# Patient Record
Sex: FEMALE | Race: WHITE | NOT HISPANIC OR LATINO | Employment: PART TIME | URBAN - METROPOLITAN AREA
[De-identification: names, ages, dates, MRNs, and addresses within clinical notes are randomized per-mention and may not be internally consistent; named-entity substitution may affect disease eponyms.]

---

## 2016-02-18 LAB — CRC RECOMMENDATION EXT: NORMAL

## 2020-02-19 LAB — BCS RECOMMENDATION EXT: NORMAL

## 2020-11-11 LAB
PAP RECOMMENDATION EXT: NORMAL
PAP SMEAR: NORMAL

## 2021-03-06 ENCOUNTER — IMMUNIZATION (OUTPATIENT)
Dept: PRIMARY CARE CLINIC | Facility: CLINIC | Age: 56
End: 2021-03-06
Payer: COMMERCIAL

## 2021-03-06 DIAGNOSIS — Z23 NEED FOR VACCINATION: Primary | ICD-10-CM

## 2021-03-06 PROCEDURE — 0001A PR IMMUNIZ ADMIN, SARS-COV-2 COVID-19 VACC, 30MCG/0.3ML, 1ST DOSE: ICD-10-PCS | Mod: CV19,S$GLB,, | Performed by: INTERNAL MEDICINE

## 2021-03-06 PROCEDURE — 0001A PR IMMUNIZ ADMIN, SARS-COV-2 COVID-19 VACC, 30MCG/0.3ML, 1ST DOSE: CPT | Mod: CV19,S$GLB,, | Performed by: INTERNAL MEDICINE

## 2021-03-06 PROCEDURE — 91300 PR SARS-COV- 2 COVID-19 VACCINE, NO PRSV, 30MCG/0.3ML, IM: CPT | Mod: S$GLB,,, | Performed by: INTERNAL MEDICINE

## 2021-03-06 PROCEDURE — 91300 PR SARS-COV- 2 COVID-19 VACCINE, NO PRSV, 30MCG/0.3ML, IM: ICD-10-PCS | Mod: S$GLB,,, | Performed by: INTERNAL MEDICINE

## 2021-03-06 RX ADMIN — Medication 0.3 ML: at 03:03

## 2021-03-27 ENCOUNTER — IMMUNIZATION (OUTPATIENT)
Dept: PRIMARY CARE CLINIC | Facility: CLINIC | Age: 56
End: 2021-03-27
Payer: COMMERCIAL

## 2021-03-27 DIAGNOSIS — Z23 NEED FOR VACCINATION: Primary | ICD-10-CM

## 2021-03-27 PROCEDURE — 0002A PR IMMUNIZ ADMIN, SARS-COV-2 COVID-19 VACC, 30MCG/0.3ML, 2ND DOSE: ICD-10-PCS | Mod: CV19,S$GLB,, | Performed by: INTERNAL MEDICINE

## 2021-03-27 PROCEDURE — 0002A PR IMMUNIZ ADMIN, SARS-COV-2 COVID-19 VACC, 30MCG/0.3ML, 2ND DOSE: CPT | Mod: CV19,S$GLB,, | Performed by: INTERNAL MEDICINE

## 2021-03-27 PROCEDURE — 91300 PR SARS-COV- 2 COVID-19 VACCINE, NO PRSV, 30MCG/0.3ML, IM: CPT | Mod: S$GLB,,, | Performed by: INTERNAL MEDICINE

## 2021-03-27 PROCEDURE — 91300 PR SARS-COV- 2 COVID-19 VACCINE, NO PRSV, 30MCG/0.3ML, IM: ICD-10-PCS | Mod: S$GLB,,, | Performed by: INTERNAL MEDICINE

## 2021-03-27 RX ADMIN — Medication 0.3 ML: at 03:03

## 2022-03-17 LAB
LEFT EYE DM RETINOPATHY: NEGATIVE
RIGHT EYE DM RETINOPATHY: NEGATIVE

## 2022-05-06 LAB — HBA1C MFR BLD: 8.8 % (ref 4–5.6)

## 2022-09-20 ENCOUNTER — OFFICE VISIT (OUTPATIENT)
Dept: FAMILY MEDICINE | Facility: CLINIC | Age: 57
End: 2022-09-20
Payer: COMMERCIAL

## 2022-09-20 ENCOUNTER — TELEPHONE (OUTPATIENT)
Dept: FAMILY MEDICINE | Facility: CLINIC | Age: 57
End: 2022-09-20

## 2022-09-20 ENCOUNTER — PATIENT OUTREACH (OUTPATIENT)
Dept: ADMINISTRATIVE | Facility: HOSPITAL | Age: 57
End: 2022-09-20
Payer: COMMERCIAL

## 2022-09-20 VITALS
WEIGHT: 190.81 LBS | RESPIRATION RATE: 18 BRPM | HEART RATE: 87 BPM | OXYGEN SATURATION: 98 % | HEIGHT: 63 IN | TEMPERATURE: 98 F | SYSTOLIC BLOOD PRESSURE: 136 MMHG | BODY MASS INDEX: 33.81 KG/M2 | DIASTOLIC BLOOD PRESSURE: 82 MMHG

## 2022-09-20 DIAGNOSIS — Z11.4 ENCOUNTER FOR SCREENING FOR HIV: ICD-10-CM

## 2022-09-20 DIAGNOSIS — E11.65 TYPE 2 DIABETES MELLITUS WITH HYPERGLYCEMIA, WITHOUT LONG-TERM CURRENT USE OF INSULIN: ICD-10-CM

## 2022-09-20 DIAGNOSIS — K21.9 GASTROESOPHAGEAL REFLUX DISEASE, UNSPECIFIED WHETHER ESOPHAGITIS PRESENT: ICD-10-CM

## 2022-09-20 DIAGNOSIS — Z23 NEED FOR DIPHTHERIA-TETANUS-PERTUSSIS (TDAP) VACCINE: ICD-10-CM

## 2022-09-20 DIAGNOSIS — Z12.31 ENCOUNTER FOR SCREENING MAMMOGRAM FOR BREAST CANCER: ICD-10-CM

## 2022-09-20 DIAGNOSIS — E78.1 HYPERTRIGLYCERIDEMIA: ICD-10-CM

## 2022-09-20 DIAGNOSIS — E03.9 HYPOTHYROIDISM, UNSPECIFIED TYPE: ICD-10-CM

## 2022-09-20 DIAGNOSIS — E11.65 TYPE 2 DIABETES MELLITUS WITH HYPERGLYCEMIA, WITHOUT LONG-TERM CURRENT USE OF INSULIN: Primary | ICD-10-CM

## 2022-09-20 DIAGNOSIS — Z23 INFLUENZA VACCINE NEEDED: ICD-10-CM

## 2022-09-20 DIAGNOSIS — Z00.00 ENCOUNTER FOR MEDICAL EXAMINATION TO ESTABLISH CARE: Primary | ICD-10-CM

## 2022-09-20 DIAGNOSIS — M25.50 POLYARTHRALGIA: ICD-10-CM

## 2022-09-20 DIAGNOSIS — E66.09 CLASS 1 OBESITY DUE TO EXCESS CALORIES WITH SERIOUS COMORBIDITY AND BODY MASS INDEX (BMI) OF 33.0 TO 33.9 IN ADULT: ICD-10-CM

## 2022-09-20 DIAGNOSIS — I10 ESSENTIAL HYPERTENSION: ICD-10-CM

## 2022-09-20 PROCEDURE — 3075F PR MOST RECENT SYSTOLIC BLOOD PRESS GE 130-139MM HG: ICD-10-PCS | Mod: CPTII,S$GLB,, | Performed by: FAMILY MEDICINE

## 2022-09-20 PROCEDURE — 3075F SYST BP GE 130 - 139MM HG: CPT | Mod: CPTII,S$GLB,, | Performed by: FAMILY MEDICINE

## 2022-09-20 PROCEDURE — 90715 TDAP VACCINE 7 YRS/> IM: CPT | Mod: S$GLB,,, | Performed by: FAMILY MEDICINE

## 2022-09-20 PROCEDURE — 90471 IMMUNIZATION ADMIN: CPT | Mod: S$GLB,,, | Performed by: FAMILY MEDICINE

## 2022-09-20 PROCEDURE — 3079F DIAST BP 80-89 MM HG: CPT | Mod: CPTII,S$GLB,, | Performed by: FAMILY MEDICINE

## 2022-09-20 PROCEDURE — 3079F PR MOST RECENT DIASTOLIC BLOOD PRESSURE 80-89 MM HG: ICD-10-PCS | Mod: CPTII,S$GLB,, | Performed by: FAMILY MEDICINE

## 2022-09-20 PROCEDURE — 99214 PR OFFICE/OUTPT VISIT, EST, LEVL IV, 30-39 MIN: ICD-10-PCS | Mod: 25,S$GLB,, | Performed by: FAMILY MEDICINE

## 2022-09-20 PROCEDURE — 90686 IIV4 VACC NO PRSV 0.5 ML IM: CPT | Mod: S$GLB,,, | Performed by: FAMILY MEDICINE

## 2022-09-20 PROCEDURE — 99999 PR PBB SHADOW E&M-EST. PATIENT-LVL V: CPT | Mod: PBBFAC,,, | Performed by: FAMILY MEDICINE

## 2022-09-20 PROCEDURE — 1160F PR REVIEW ALL MEDS BY PRESCRIBER/CLIN PHARMACIST DOCUMENTED: ICD-10-PCS | Mod: CPTII,S$GLB,, | Performed by: FAMILY MEDICINE

## 2022-09-20 PROCEDURE — 99214 OFFICE O/P EST MOD 30 MIN: CPT | Mod: 25,S$GLB,, | Performed by: FAMILY MEDICINE

## 2022-09-20 PROCEDURE — 1159F MED LIST DOCD IN RCRD: CPT | Mod: CPTII,S$GLB,, | Performed by: FAMILY MEDICINE

## 2022-09-20 PROCEDURE — 1160F RVW MEDS BY RX/DR IN RCRD: CPT | Mod: CPTII,S$GLB,, | Performed by: FAMILY MEDICINE

## 2022-09-20 PROCEDURE — 1159F PR MEDICATION LIST DOCUMENTED IN MEDICAL RECORD: ICD-10-PCS | Mod: CPTII,S$GLB,, | Performed by: FAMILY MEDICINE

## 2022-09-20 PROCEDURE — 4010F PR ACE/ARB THEARPY RXD/TAKEN: ICD-10-PCS | Mod: CPTII,S$GLB,, | Performed by: FAMILY MEDICINE

## 2022-09-20 PROCEDURE — 90471 FLU VACCINE (QUAD) GREATER THAN OR EQUAL TO 3YO PRESERVATIVE FREE IM: ICD-10-PCS | Mod: S$GLB,,, | Performed by: FAMILY MEDICINE

## 2022-09-20 PROCEDURE — 90715 TDAP VACCINE GREATER THAN OR EQUAL TO 7YO IM: ICD-10-PCS | Mod: S$GLB,,, | Performed by: FAMILY MEDICINE

## 2022-09-20 PROCEDURE — 3008F BODY MASS INDEX DOCD: CPT | Mod: CPTII,S$GLB,, | Performed by: FAMILY MEDICINE

## 2022-09-20 PROCEDURE — 4010F ACE/ARB THERAPY RXD/TAKEN: CPT | Mod: CPTII,S$GLB,, | Performed by: FAMILY MEDICINE

## 2022-09-20 PROCEDURE — 90472 TDAP VACCINE GREATER THAN OR EQUAL TO 7YO IM: ICD-10-PCS | Mod: S$GLB,,, | Performed by: FAMILY MEDICINE

## 2022-09-20 PROCEDURE — 90472 IMMUNIZATION ADMIN EACH ADD: CPT | Mod: S$GLB,,, | Performed by: FAMILY MEDICINE

## 2022-09-20 PROCEDURE — 90686 FLU VACCINE (QUAD) GREATER THAN OR EQUAL TO 3YO PRESERVATIVE FREE IM: ICD-10-PCS | Mod: S$GLB,,, | Performed by: FAMILY MEDICINE

## 2022-09-20 PROCEDURE — 3008F PR BODY MASS INDEX (BMI) DOCUMENTED: ICD-10-PCS | Mod: CPTII,S$GLB,, | Performed by: FAMILY MEDICINE

## 2022-09-20 PROCEDURE — 99999 PR PBB SHADOW E&M-EST. PATIENT-LVL V: ICD-10-PCS | Mod: PBBFAC,,, | Performed by: FAMILY MEDICINE

## 2022-09-20 RX ORDER — MELOXICAM 15 MG/1
15 TABLET ORAL DAILY
Qty: 30 TABLET | Refills: 5 | Status: SHIPPED | OUTPATIENT
Start: 2022-09-20

## 2022-09-20 RX ORDER — METFORMIN HYDROCHLORIDE 500 MG/1
TABLET, EXTENDED RELEASE ORAL
COMMUNITY
Start: 2022-05-20 | End: 2022-12-09 | Stop reason: SDUPTHER

## 2022-09-20 RX ORDER — MELOXICAM 15 MG/1
15 TABLET ORAL DAILY
COMMUNITY
End: 2022-09-20 | Stop reason: SDUPTHER

## 2022-09-20 RX ORDER — FENOFIBRATE 48 MG/1
48 TABLET, FILM COATED ORAL DAILY
Qty: 30 TABLET | Refills: 11 | Status: SHIPPED | OUTPATIENT
Start: 2022-09-20 | End: 2022-12-09 | Stop reason: ALTCHOICE

## 2022-09-20 RX ORDER — AMLODIPINE AND VALSARTAN 5; 320 MG/1; MG/1
1 TABLET ORAL DAILY
COMMUNITY
Start: 2022-09-09 | End: 2022-12-09

## 2022-09-20 RX ORDER — DULAGLUTIDE 0.75 MG/.5ML
INJECTION, SOLUTION SUBCUTANEOUS
COMMUNITY
Start: 2022-08-09 | End: 2022-09-20

## 2022-09-20 RX ORDER — BACLOFEN 10 MG/1
10 TABLET ORAL 2 TIMES DAILY
COMMUNITY
Start: 2022-05-20 | End: 2023-07-17

## 2022-09-20 RX ORDER — FENOFIBRATE 48 MG/1
48 TABLET, FILM COATED ORAL DAILY
COMMUNITY
Start: 2022-09-13 | End: 2022-09-20 | Stop reason: SDUPTHER

## 2022-09-20 NOTE — TELEPHONE ENCOUNTER
----- Message from Judd Mak LPN sent at 9/20/2022 10:07 AM CDT -----  Wants her labs sent to [facility].
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
## 2022-09-20 NOTE — PROGRESS NOTES
Overdue Colonoscopy - A request was sent today for patient's record. I spoke w/ Mukul, she will have the patient's results faxed over to our office.    Mammogram and  Pap Smear updated.

## 2022-09-21 ENCOUNTER — PATIENT OUTREACH (OUTPATIENT)
Dept: ADMINISTRATIVE | Facility: HOSPITAL | Age: 57
End: 2022-09-21
Payer: COMMERCIAL

## 2022-09-21 NOTE — PROGRESS NOTES
Assessment & Plan  Type 2 diabetes mellitus with hyperglycemia, without long-term current use of insulin  -     Cancel: Microalbumin/Creatinine Ratio, Urine; Future; Expected date: 2022  -     Cancel: Microalbumin/Creatinine Ratio, Urine; Future; Expected date: 2022  -     Cancel: Microalbumin/Creatinine Ratio, Urine; Future; Expected date: 2022  -     Cancel: Microalbumin/Creatinine Ratio, Urine; Future  -     Microalbumin/Creatinine Ratio, Urine; Future; Expected date: 2022          Health maintenance reviewed, ***.    Follow-up: No follow-ups on file.  ______________________________________________________________________    Chief Complaint  No chief complaint on file.      GOLDY Quintanilla is a 57 y.o. female with medical diagnoses as listed in the medical history and problem list that presents to the office to establish care.     Health Maintenance         Date Due Completion Date    Diabetes Urine Screening Never done ---    Foot Exam Never done ---    Eye Exam Never done ---    HIV Screening Never done ---    Low Dose Statin Never done ---    Shingles Vaccine (1 of 2) Never done ---    Mammogram 2021    COVID-19 Vaccine (4 - Booster for Pfizer series) 2022    Hemoglobin A1c 2022    Lipid Panel 2023    Pneumococcal Vaccines (Age 0-64) (2 - PPSV23 or PCV20) 2023    Cervical Cancer Screening 2023    Colorectal Cancer Screening 2026    TETANUS VACCINE 2032              PAST MEDICAL HISTORY:  Past Medical History:   Diagnosis Date    Hypertension     Hypothyroid        PAST SURGICAL HISTORY:  Past Surgical History:   Procedure Laterality Date     SECTION, CLASSIC      TUBAL LIGATION      URETHRAL SLING         SOCIAL HISTORY:  Social History     Socioeconomic History    Marital status:     Number of children: 1   Tobacco Use    Smoking status:  Never     Passive exposure: Never    Smokeless tobacco: Never   Substance and Sexual Activity    Alcohol use: Yes     Alcohol/week: 2.0 standard drinks     Types: 1 Glasses of wine, 1 Shots of liquor per week     Comment: rare    Drug use: Never    Sexual activity: Yes     Partners: Male     Birth control/protection: None       FAMILY HISTORY:  Family History   Problem Relation Age of Onset    Kidney disease Mother     Hypertension Mother     Diabetes Mother     Stroke Father     Heart disease Father     Drug abuse Father     Asthma Father     Alcohol abuse Father     Drug abuse Sister     Depression Sister     Birth defects Sister     Mental illness Sister     Hypertension Sister     Drug abuse Sister     Diabetes Sister     Depression Sister     Drug abuse Brother     Drug abuse Brother     Asthma Daughter        ALLERGIES AND MEDICATIONS: updated and reviewed.  Review of patient's allergies indicates:  No Known Allergies  Current Outpatient Medications   Medication Sig Dispense Refill    amlodipine-valsartan (EXFORGE) 5-320 mg per tablet Take 1 tablet by mouth once daily.      baclofen (LIORESAL) 10 MG tablet Take 10 mg by mouth 2 (two) times daily.      dulaglutide (TRULICITY) 1.5 mg/0.5 mL pen injector Inject 1.5 mg into the skin every 7 days. 4 pen 11    fenofibrate (TRICOR) 48 MG tablet Take 1 tablet (48 mg total) by mouth once daily. 30 tablet 11    levothyroxine (SYNTHROID) 125 MCG tablet Take 125 mcg by mouth once daily.      meloxicam (MOBIC) 15 MG tablet Take 1 tablet (15 mg total) by mouth once daily. 30 tablet 5    metFORMIN (GLUCOPHAGE-XR) 500 MG ER 24hr tablet TAKE 2 TABLETS BY MOUTH TWICE DAILY WITH FOOD ** NO ALCOHOL **      omeprazole (PRILOSEC) 40 MG capsule Take 40 mg by mouth once daily.       No current facility-administered medications for this visit.         ROS  Review of Systems        Physical Exam  There were no vitals filed for this visit. There is no height or weight on file to  calculate BMI.          Physical Exam

## 2022-10-04 ENCOUNTER — HOSPITAL ENCOUNTER (OUTPATIENT)
Dept: RADIOLOGY | Facility: HOSPITAL | Age: 57
Discharge: HOME | End: 2022-10-04
Attending: FAMILY MEDICINE
Payer: COMMERCIAL

## 2022-10-04 DIAGNOSIS — Z12.31 ENCOUNTER FOR SCREENING MAMMOGRAM FOR BREAST CANCER: ICD-10-CM

## 2022-10-04 PROCEDURE — 77063 MAMMO DIGITAL SCREENING BILAT WITH TOMO: ICD-10-PCS | Mod: 26,,, | Performed by: RADIOLOGY

## 2022-10-04 PROCEDURE — 77067 SCR MAMMO BI INCL CAD: CPT | Mod: TC,PO

## 2022-10-04 PROCEDURE — 77067 MAMMO DIGITAL SCREENING BILAT WITH TOMO: ICD-10-PCS | Mod: 26,,, | Performed by: RADIOLOGY

## 2022-10-04 PROCEDURE — 77063 BREAST TOMOSYNTHESIS BI: CPT | Mod: TC,PO

## 2022-10-04 PROCEDURE — 77063 BREAST TOMOSYNTHESIS BI: CPT | Mod: 26,,, | Performed by: RADIOLOGY

## 2022-10-04 PROCEDURE — 77067 SCR MAMMO BI INCL CAD: CPT | Mod: 26,,, | Performed by: RADIOLOGY

## 2022-10-17 ENCOUNTER — PATIENT MESSAGE (OUTPATIENT)
Dept: FAMILY MEDICINE | Facility: CLINIC | Age: 57
End: 2022-10-17
Payer: COMMERCIAL

## 2022-10-17 NOTE — TELEPHONE ENCOUNTER
Call was placed to radiology and received VM, messge was left for return call regarding pts outstanding mammo results. Pt was notified of outreach via portal.

## 2022-11-18 ENCOUNTER — TELEPHONE (OUTPATIENT)
Dept: PHARMACY | Facility: CLINIC | Age: 57
End: 2022-11-18
Payer: COMMERCIAL

## 2022-11-18 DIAGNOSIS — E66.09 CLASS 1 OBESITY DUE TO EXCESS CALORIES WITH SERIOUS COMORBIDITY AND BODY MASS INDEX (BMI) OF 33.0 TO 33.9 IN ADULT: ICD-10-CM

## 2022-11-18 DIAGNOSIS — E11.65 TYPE 2 DIABETES MELLITUS WITH HYPERGLYCEMIA, WITHOUT LONG-TERM CURRENT USE OF INSULIN: ICD-10-CM

## 2022-11-18 DIAGNOSIS — E11.65 TYPE 2 DIABETES MELLITUS WITH HYPERGLYCEMIA, WITHOUT LONG-TERM CURRENT USE OF INSULIN: Primary | ICD-10-CM

## 2022-11-18 NOTE — TELEPHONE ENCOUNTER
Care Due:                  Date            Visit Type   Department     Provider  --------------------------------------------------------------------------------                                NP -         [facility]                              PRIMARY      MED/ INTERNAL  Last Visit: 09-      CARE ([facility])   MED/ PEDS      Britney Byrd                              EP -         [facility]      MED/ INTERNAL  Next Visit: 12-      CARE ([facility])   MED/ PEDS      Britney Byrd                                                            Last  Test          Frequency    Reason                     Performed    Due Date  --------------------------------------------------------------------------------    CBC.........  12 months..  fenofibrate..............  Not Found    Overdue    CMP.........  12 months..  fenofibrate..............  Not Found    Overdue    HBA1C.......  6 months...  dulaglutide..............  05- 11-    Lipid Panel.  12 months..  fenofibrate..............  Not Found    Overdue    [employer] Embedded Care Gaps. Reference number: [id number]. 11/18/2022   12:40:19 PM CST

## 2022-11-21 ENCOUNTER — TELEPHONE (OUTPATIENT)
Dept: PHARMACY | Facility: CLINIC | Age: 57
End: 2022-11-21
Payer: COMMERCIAL

## 2022-11-22 ENCOUNTER — PATIENT MESSAGE (OUTPATIENT)
Dept: FAMILY MEDICINE | Facility: CLINIC | Age: 57
End: 2022-11-22
Payer: COMMERCIAL

## 2022-12-05 LAB
ALBUMIN SERPL-MCNC: 4.4 G/DL (ref 3.6–5.1)
ALBUMIN/GLOB SERPL: 1.4 (CALC) (ref 1–2.5)
ALP SERPL-CCNC: 51 U/L (ref 37–153)
ALT SERPL-CCNC: 26 U/L (ref 6–29)
AST SERPL-CCNC: 23 U/L (ref 10–35)
BASOPHILS # BLD AUTO: 94 CELLS/UL (ref 0–200)
BASOPHILS NFR BLD AUTO: 1 %
BILIRUB SERPL-MCNC: 0.3 MG/DL (ref 0.2–1.2)
BUN SERPL-MCNC: 17 MG/DL (ref 7–25)
BUN/CREAT SERPL: NORMAL (CALC) (ref 6–22)
CALCIUM SERPL-MCNC: 9.6 MG/DL (ref 8.6–10.4)
CHLORIDE SERPL-SCNC: 106 MMOL/L (ref 98–110)
CHOLEST SERPL-MCNC: 227 MG/DL
CHOLEST/HDLC SERPL: 4.7 (CALC)
CO2 SERPL-SCNC: 23 MMOL/L (ref 20–32)
CREAT SERPL-MCNC: 0.99 MG/DL (ref 0.5–1.03)
EGFR: 67 ML/MIN/1.73M2
EOSINOPHIL # BLD AUTO: 282 CELLS/UL (ref 15–500)
EOSINOPHIL NFR BLD AUTO: 3 %
ERYTHROCYTE [DISTWIDTH] IN BLOOD BY AUTOMATED COUNT: 13.2 % (ref 11–15)
GLOBULIN SER CALC-MCNC: 3.1 G/DL (CALC) (ref 1.9–3.7)
GLUCOSE SERPL-MCNC: 99 MG/DL (ref 65–99)
HBA1C MFR BLD: 6.4 % OF TOTAL HGB
HCT VFR BLD AUTO: 39.4 % (ref 35–45)
HDLC SERPL-MCNC: 48 MG/DL
HGB BLD-MCNC: 13.2 G/DL (ref 11.7–15.5)
HIV 1+2 AB+HIV1 P24 AG SERPL QL IA: NORMAL
LDLC SERPL CALC-MCNC: 148 MG/DL (CALC)
LYMPHOCYTES # BLD AUTO: 2529 CELLS/UL (ref 850–3900)
LYMPHOCYTES NFR BLD AUTO: 26.9 %
MCH RBC QN AUTO: 30.2 PG (ref 27–33)
MCHC RBC AUTO-ENTMCNC: 33.5 G/DL (ref 32–36)
MCV RBC AUTO: 90.2 FL (ref 80–100)
MONOCYTES # BLD AUTO: 968 CELLS/UL (ref 200–950)
MONOCYTES NFR BLD AUTO: 10.3 %
NEUTROPHILS # BLD AUTO: 5527 CELLS/UL (ref 1500–7800)
NEUTROPHILS NFR BLD AUTO: 58.8 %
NONHDLC SERPL-MCNC: 179 MG/DL (CALC)
PLATELET # BLD AUTO: 321 THOUSAND/UL (ref 140–400)
PMV BLD REES-ECKER: 10.8 FL (ref 7.5–12.5)
POTASSIUM SERPL-SCNC: 4.3 MMOL/L (ref 3.5–5.3)
PROT SERPL-MCNC: 7.5 G/DL (ref 6.1–8.1)
RBC # BLD AUTO: 4.37 MILLION/UL (ref 3.8–5.1)
SODIUM SERPL-SCNC: 140 MMOL/L (ref 135–146)
T4 FREE SERPL-MCNC: 1.5 NG/DL (ref 0.8–1.8)
TRIGL SERPL-MCNC: 173 MG/DL
TSH SERPL-ACNC: 3.51 MIU/L (ref 0.4–4.5)
WBC # BLD AUTO: 9.4 THOUSAND/UL (ref 3.8–10.8)

## 2022-12-08 LAB
PAP RECOMMENDATION EXT: NORMAL
PAP SMEAR: NORMAL

## 2022-12-09 ENCOUNTER — OFFICE VISIT (OUTPATIENT)
Dept: FAMILY MEDICINE | Facility: CLINIC | Age: 57
End: 2022-12-09
Payer: COMMERCIAL

## 2022-12-09 VITALS
DIASTOLIC BLOOD PRESSURE: 86 MMHG | HEIGHT: 63 IN | SYSTOLIC BLOOD PRESSURE: 138 MMHG | HEART RATE: 86 BPM | TEMPERATURE: 98 F | WEIGHT: 192 LBS | OXYGEN SATURATION: 98 % | BODY MASS INDEX: 34.02 KG/M2

## 2022-12-09 DIAGNOSIS — E03.9 HYPOTHYROIDISM, UNSPECIFIED TYPE: ICD-10-CM

## 2022-12-09 DIAGNOSIS — E11.42 TYPE 2 DIABETES MELLITUS WITH DIABETIC POLYNEUROPATHY, WITHOUT LONG-TERM CURRENT USE OF INSULIN: Primary | ICD-10-CM

## 2022-12-09 DIAGNOSIS — Z11.59 NEED FOR HEPATITIS B SCREENING TEST: ICD-10-CM

## 2022-12-09 DIAGNOSIS — Z11.59 NEED FOR HEPATITIS C SCREENING TEST: ICD-10-CM

## 2022-12-09 DIAGNOSIS — E78.5 DYSLIPIDEMIA ASSOCIATED WITH TYPE 2 DIABETES MELLITUS: ICD-10-CM

## 2022-12-09 DIAGNOSIS — E66.09 CLASS 1 OBESITY DUE TO EXCESS CALORIES WITH SERIOUS COMORBIDITY AND BODY MASS INDEX (BMI) OF 34.0 TO 34.9 IN ADULT: ICD-10-CM

## 2022-12-09 DIAGNOSIS — E11.69 DYSLIPIDEMIA ASSOCIATED WITH TYPE 2 DIABETES MELLITUS: ICD-10-CM

## 2022-12-09 DIAGNOSIS — I10 ESSENTIAL HYPERTENSION: ICD-10-CM

## 2022-12-09 LAB
ALBUMIN/CREAT UR: 294 UG/MG (ref 0–30)
CREAT UR-MCNC: 50 MG/DL (ref 15–325)
MICROALBUMIN UR DL<=1MG/L-MCNC: 147 UG/ML

## 2022-12-09 PROCEDURE — 99999 PR PBB SHADOW E&M-EST. PATIENT-LVL V: CPT | Mod: PBBFAC,,, | Performed by: FAMILY MEDICINE

## 2022-12-09 PROCEDURE — 1159F MED LIST DOCD IN RCRD: CPT | Mod: CPTII,S$GLB,, | Performed by: FAMILY MEDICINE

## 2022-12-09 PROCEDURE — 3008F BODY MASS INDEX DOCD: CPT | Mod: CPTII,S$GLB,, | Performed by: FAMILY MEDICINE

## 2022-12-09 PROCEDURE — 3044F PR MOST RECENT HEMOGLOBIN A1C LEVEL <7.0%: ICD-10-PCS | Mod: CPTII,S$GLB,, | Performed by: FAMILY MEDICINE

## 2022-12-09 PROCEDURE — 99214 PR OFFICE/OUTPT VISIT, EST, LEVL IV, 30-39 MIN: ICD-10-PCS | Mod: S$GLB,,, | Performed by: FAMILY MEDICINE

## 2022-12-09 PROCEDURE — 1160F RVW MEDS BY RX/DR IN RCRD: CPT | Mod: CPTII,S$GLB,, | Performed by: FAMILY MEDICINE

## 2022-12-09 PROCEDURE — 4010F PR ACE/ARB THEARPY RXD/TAKEN: ICD-10-PCS | Mod: CPTII,S$GLB,, | Performed by: FAMILY MEDICINE

## 2022-12-09 PROCEDURE — 1160F PR REVIEW ALL MEDS BY PRESCRIBER/CLIN PHARMACIST DOCUMENTED: ICD-10-PCS | Mod: CPTII,S$GLB,, | Performed by: FAMILY MEDICINE

## 2022-12-09 PROCEDURE — 82570 ASSAY OF URINE CREATININE: CPT | Performed by: FAMILY MEDICINE

## 2022-12-09 PROCEDURE — 3044F HG A1C LEVEL LT 7.0%: CPT | Mod: CPTII,S$GLB,, | Performed by: FAMILY MEDICINE

## 2022-12-09 PROCEDURE — 99214 OFFICE O/P EST MOD 30 MIN: CPT | Mod: S$GLB,,, | Performed by: FAMILY MEDICINE

## 2022-12-09 PROCEDURE — 3075F SYST BP GE 130 - 139MM HG: CPT | Mod: CPTII,S$GLB,, | Performed by: FAMILY MEDICINE

## 2022-12-09 PROCEDURE — 3079F PR MOST RECENT DIASTOLIC BLOOD PRESSURE 80-89 MM HG: ICD-10-PCS | Mod: CPTII,S$GLB,, | Performed by: FAMILY MEDICINE

## 2022-12-09 PROCEDURE — 82043 UR ALBUMIN QUANTITATIVE: CPT | Performed by: FAMILY MEDICINE

## 2022-12-09 PROCEDURE — 4010F ACE/ARB THERAPY RXD/TAKEN: CPT | Mod: CPTII,S$GLB,, | Performed by: FAMILY MEDICINE

## 2022-12-09 PROCEDURE — 99999 PR PBB SHADOW E&M-EST. PATIENT-LVL V: ICD-10-PCS | Mod: PBBFAC,,, | Performed by: FAMILY MEDICINE

## 2022-12-09 PROCEDURE — 1159F PR MEDICATION LIST DOCUMENTED IN MEDICAL RECORD: ICD-10-PCS | Mod: CPTII,S$GLB,, | Performed by: FAMILY MEDICINE

## 2022-12-09 PROCEDURE — 3008F PR BODY MASS INDEX (BMI) DOCUMENTED: ICD-10-PCS | Mod: CPTII,S$GLB,, | Performed by: FAMILY MEDICINE

## 2022-12-09 PROCEDURE — 3075F PR MOST RECENT SYSTOLIC BLOOD PRESS GE 130-139MM HG: ICD-10-PCS | Mod: CPTII,S$GLB,, | Performed by: FAMILY MEDICINE

## 2022-12-09 PROCEDURE — 3079F DIAST BP 80-89 MM HG: CPT | Mod: CPTII,S$GLB,, | Performed by: FAMILY MEDICINE

## 2022-12-09 RX ORDER — ESTRADIOL 0.1 MG/G
0.5 CREAM VAGINAL
COMMUNITY
Start: 2022-12-08 | End: 2023-12-08

## 2022-12-09 RX ORDER — LEVOTHYROXINE SODIUM 137 UG/1
137 TABLET ORAL
COMMUNITY
Start: 2022-11-11 | End: 2023-09-26 | Stop reason: SDUPTHER

## 2022-12-09 RX ORDER — TRIAMCINOLONE ACETONIDE 5 MG/G
OINTMENT TOPICAL
COMMUNITY
Start: 2022-12-08 | End: 2023-12-08

## 2022-12-09 RX ORDER — FENOFIBRATE 54 MG/1
54 TABLET ORAL DAILY
Qty: 90 TABLET | Refills: 3 | Status: SHIPPED | OUTPATIENT
Start: 2022-12-09 | End: 2023-09-26 | Stop reason: SDUPTHER

## 2022-12-09 RX ORDER — CICLOPIROX OLAMINE 7.7 MG/G
CREAM TOPICAL
COMMUNITY
Start: 2022-12-08

## 2022-12-09 RX ORDER — AMLODIPINE AND VALSARTAN 10; 320 MG/1; MG/1
1 TABLET ORAL DAILY
Qty: 90 TABLET | Refills: 3 | Status: SHIPPED | OUTPATIENT
Start: 2022-12-09 | End: 2023-09-26 | Stop reason: SDUPTHER

## 2022-12-09 RX ORDER — METFORMIN HYDROCHLORIDE 500 MG/1
TABLET, EXTENDED RELEASE ORAL
Qty: 270 TABLET | Refills: 3 | Status: SHIPPED | OUTPATIENT
Start: 2022-12-09 | End: 2023-09-26

## 2022-12-09 RX ORDER — FLUCONAZOLE 150 MG/1
150 TABLET ORAL
COMMUNITY
Start: 2022-12-08

## 2022-12-09 RX ORDER — TIRZEPATIDE 5 MG/.5ML
5 INJECTION, SOLUTION SUBCUTANEOUS
Qty: 4 PEN | Refills: 11 | Status: SHIPPED | OUTPATIENT
Start: 2022-12-09 | End: 2023-01-23

## 2022-12-09 NOTE — PROGRESS NOTES
Assessment & Plan  Type 2 diabetes mellitus with diabetic polyneuropathy, without long-term current use of insulin  -     Microalbumin/Creatinine Ratio, Urine; Future; Expected date: 12/09/2022  -     metFORMIN (GLUCOPHAGE-XR) 500 MG ER 24hr tablet; Take 1000 mg (2 tab) po in the morning and 500 mg (1 tab) po before bed  Dispense: 270 tablet; Refill: 3  -     tirzepatide (MOUNJARO) 5 mg/0.5 mL PnIj; Inject 5 mg into the skin every 7 days.  Dispense: 4 pen; Refill: 11  -     Hemoglobin A1C; Future; Expected date: 12/09/2022    Labs reviewed with patient. A1c is at goal. Continue metformin XR 1000 mg in the morning and 500 mg in the evening. Will try PA for Mounjaro to further help with weight loss. Foot exam performed. Recommended to try Nervive or alpha lipoic acid 600 mg prn for neuropathy. Urine microalb/Cr collected today. Repeat A1c in 3 months before follow up.     Dyslipidemia associated with type 2 diabetes mellitus  -     fenofibrate (TRICOR) 54 MG tablet; Take 1 tablet (54 mg total) by mouth once daily.  Dispense: 90 tablet; Refill: 3  -     Lipid Panel; Future; Expected date: 12/09/2022    Lipid panel discussed with patient. Counseled on the necessary dietary changes needed to improve TC. Intolerant to statins. Increase Tricor to 54 mcg. Continue omega-3-FA.     Class 1 obesity due to excess calories with serious comorbidity and body mass index (BMI) of 34.0 to 34.9 in adult  -     tirzepatide (MOUNJARO) 5 mg/0.5 mL PnIj; Inject 5 mg into the skin every 7 days.  Dispense: 4 pen; Refill: 11    See above.    Essential hypertension  -     amlodipine-valsartan (EXFORGE)  mg per tablet; Take 1 tablet by mouth once daily.  Dispense: 90 tablet; Refill: 3    Increase Exforge to  mg. Advised to purchase BP cuff and check BP 2-3 times a week to ensure at goal <130/80.    Hypothyroidism, unspecified type  Controlled. Continue current therapy.     Need for hepatitis B screening test  -     Hepatitis Panel,  Acute; Future; Expected date: 12/09/2022    Need for hepatitis C screening test  -     Hepatitis Panel, Acute; Future; Expected date: 12/09/2022      Follow-up: Follow up in about 3 months (around 3/9/2023).  ______________________________________________________________________    Chief Complaint  Chief Complaint   Patient presents with    Follow-up       HPI  Shonda Quintanilla is a 57 y.o. female with medical diagnoses as listed in the medical history and problem list that presents to the office a 3 month follow up on type 2 diabetes mellitis and weight loss. She was last seen in the office on 9/20. Patient was started on Trulicity 1.5 mg qweekly, which she was able to fill despite the high co-pay of about $300. She has been tolerating this well. Unfortunately, she has not lost any significant weight, weighing 2 lbs more than her last visit. In regards to her cholesterol, she is pleased with an improvement in her triglycerides on Tricor. She admits to eating sausage biscuits in the morning which could be raising her levels. She takes a daily omega-3-FA capsule. She has seen a dietitian in a past. She states that her Bgs run at a normal range at home and was experiencing symptoms of hypoglycemia that would wake her up at night, so she initially stopped taking her metformin dose of 1000 mg at night. She restarted taking 500 mg in the evening again when she noticed her AM BG increase to the 140s. Denies any further symptoms of hypoglycemia in the night. She experiences occasional neuropathy, particularly when standing for extended periods of time. She has tried gabapentin in the past but this caused severe drowsiness. While discussing her negative HIV test, she mentions that she punctured her skin through a glove with a scaling tool and may need a hepatitis screening test. In regard to her blood pressure, she states that she was taking Benicar, which controlled her BP well, but she was told by another doctor that she  should stop taking it. She has a history of renal failure during pregnancy.     Most recent pertinent workup:     Last CBC Results:   Lab Results   Component Value Date    WBC 9.4 2022    HGB 13.2 2022    HCT 39.4 2022     2022       Last CMP Results  Lab Results   Component Value Date     2022    K 4.3 2022     2022    CO2 23 2022    BUN 17 2022    CREATININE 0.99 2022    CALCIUM 9.6 2022    ALBUMIN 4.4 2022    AST 23 2022    ALT 26 2022       Last Lipids  Lab Results   Component Value Date    CHOL 227 (H) 2022    TRIG 173 (H) 2022    HDL 48 (L) 2022    LDLCALC 148 (H) 2022   The 10-year ASCVD risk score (Yoav PETERSON, et al., 2019) is: 9%    Values used to calculate the score:      Age: 57 years      Sex: Female      Is Non- : No      Diabetic: Yes      Tobacco smoker: No      Systolic Blood Pressure: 138 mmHg      Is BP treated: Yes      HDL Cholesterol: 48 mg/dL      Total Cholesterol: 227 mg/dL      Last A1C  Lab Results   Component Value Date    HGBA1C 6.4 (H) 2022       Last urine microalbumin/Cr  No results found for: MICALBCREAT      Health Maintenance         Date Due Completion Date    Shingles Vaccine (1 of 2) Never done ---    COVID-19 Vaccine (4 - Booster for Pfizer series) 2022    Mammogram 10/04/2023 10/4/2022    Cervical Cancer Screening 2023    Colorectal Cancer Screening 2026    Lipid Panel 2027    TETANUS VACCINE 2032              PAST MEDICAL HISTORY:  Past Medical History:   Diagnosis Date    Hypertension     Hypothyroid        PAST SURGICAL HISTORY:  Past Surgical History:   Procedure Laterality Date     SECTION, CLASSIC      TUBAL LIGATION      URETHRAL SLING         SOCIAL HISTORY:  Social History     Socioeconomic History    Marital status:      Number of children: 1   Tobacco Use    Smoking status: Never     Passive exposure: Never    Smokeless tobacco: Never   Substance and Sexual Activity    Alcohol use: Yes     Alcohol/week: 2.0 standard drinks     Types: 1 Glasses of wine, 1 Shots of liquor per week     Comment: rare    Drug use: Never    Sexual activity: Yes     Partners: Male     Birth control/protection: None       FAMILY HISTORY:  Family History   Problem Relation Age of Onset    Kidney disease Mother     Hypertension Mother     Diabetes Mother     Stroke Father     Heart disease Father     Drug abuse Father     Asthma Father     Alcohol abuse Father     Drug abuse Sister     Depression Sister     Birth defects Sister     Mental illness Sister     Hypertension Sister     Drug abuse Sister     Diabetes Sister     Depression Sister     Drug abuse Brother     Drug abuse Brother     Asthma Daughter        ALLERGIES AND MEDICATIONS: updated and reviewed.  Review of patient's allergies indicates:   Allergen Reactions    Statins-hmg-coa reductase inhibitors Other (See Comments)     myalgia     Current Outpatient Medications   Medication Sig Dispense Refill    baclofen (LIORESAL) 10 MG tablet Take 10 mg by mouth 2 (two) times daily.      ciclopirox (LOPROX) 0.77 % Crea Apply topically.      estradioL (ESTRACE) 0.01 % (0.1 mg/gram) vaginal cream Place 0.5 g vaginally.      fluconazole (DIFLUCAN) 150 MG Tab Take 150 mg by mouth.      meloxicam (MOBIC) 15 MG tablet Take 1 tablet (15 mg total) by mouth once daily. 30 tablet 5    omeprazole (PRILOSEC) 40 MG capsule Take 40 mg by mouth once daily.      SYNTHROID 137 mcg Tab tablet Take 137 mcg by mouth.      triamcinolone (KENALOG) 0.5 % ointment Apply topically.      amlodipine-valsartan (EXFORGE)  mg per tablet Take 1 tablet by mouth once daily. 90 tablet 3    fenofibrate (TRICOR) 54 MG tablet Take 1 tablet (54 mg total) by mouth once daily. 90 tablet 3    metFORMIN (GLUCOPHAGE-XR) 500 MG ER 24hr  "tablet Take 1000 mg (2 tab) po in the morning and 500 mg (1 tab) po before bed 270 tablet 3    tirzepatide (MOUNJARO) 5 mg/0.5 mL PnIj Inject 5 mg into the skin every 7 days. 4 pen 11     No current facility-administered medications for this visit.         ROS  Review of Systems   Constitutional:  Negative for activity change, fever and unexpected weight change.   HENT:  Negative for congestion and sore throat.    Respiratory:  Negative for cough and shortness of breath.    Cardiovascular:  Negative for chest pain and leg swelling.   Gastrointestinal:  Negative for abdominal pain, constipation, diarrhea, nausea and vomiting.   Endocrine: Negative for polydipsia, polyphagia and polyuria.   Genitourinary:  Negative for dysuria and urgency.   Skin:  Negative for color change and rash.   Neurological:  Negative for dizziness, weakness and headaches.   Psychiatric/Behavioral:  Positive for sleep disturbance. Negative for dysphoric mood. The patient is not nervous/anxious.          Physical Exam  Vitals:    12/09/22 1019   BP: 138/86   BP Location: Right arm   Patient Position: Sitting   BP Method: Medium (Manual)   Pulse: 86   Temp: 98.2 °F (36.8 °C)   TempSrc: Oral   SpO2: 98%   Weight: 87.1 kg (192 lb 0.3 oz)   Height: 5' 3" (1.6 m)    Body mass index is 34.01 kg/m².  Weight: 87.1 kg (192 lb 0.3 oz)   Height: 5' 3" (160 cm)   Physical Exam  Constitutional:       General: She is not in acute distress.     Appearance: She is obese.   HENT:      Head: Normocephalic and atraumatic.   Feet:      Comments: Protective Sensation (w/ 10 gram monofilament):  Right: Intact  Left: Intact    Visual Inspection:  Normal -  Bilateral    Pedal Pulses:   Right: Present  Left: Present    Posterior tibialis:   Right:Present  Left: Present      Skin:     General: Skin is warm and dry.   Neurological:      Mental Status: She is alert. Mental status is at baseline.   Psychiatric:         Mood and Affect: Mood normal.         Behavior: " Behavior normal.

## 2022-12-12 ENCOUNTER — TELEPHONE (OUTPATIENT)
Dept: PHARMACY | Facility: CLINIC | Age: 57
End: 2022-12-12
Payer: COMMERCIAL

## 2022-12-13 ENCOUNTER — PATIENT MESSAGE (OUTPATIENT)
Dept: FAMILY MEDICINE | Facility: CLINIC | Age: 57
End: 2022-12-13
Payer: COMMERCIAL

## 2023-01-10 LAB — BMD RECOMMENDATION EXT: NORMAL

## 2023-01-23 ENCOUNTER — PATIENT MESSAGE (OUTPATIENT)
Dept: FAMILY MEDICINE | Facility: CLINIC | Age: 58
End: 2023-01-23
Payer: COMMERCIAL

## 2023-01-23 DIAGNOSIS — E11.42 TYPE 2 DIABETES MELLITUS WITH DIABETIC POLYNEUROPATHY, WITHOUT LONG-TERM CURRENT USE OF INSULIN: Primary | ICD-10-CM

## 2023-01-23 RX ORDER — DULAGLUTIDE 1.5 MG/.5ML
1.5 INJECTION, SOLUTION SUBCUTANEOUS
Qty: 4 PEN | Refills: 11 | Status: SHIPPED | OUTPATIENT
Start: 2023-01-23 | End: 2023-03-21

## 2023-02-04 ENCOUNTER — PATIENT MESSAGE (OUTPATIENT)
Dept: FAMILY MEDICINE | Facility: CLINIC | Age: 58
End: 2023-02-04
Payer: COMMERCIAL

## 2023-02-06 ENCOUNTER — E-VISIT (OUTPATIENT)
Dept: FAMILY MEDICINE | Facility: CLINIC | Age: 58
End: 2023-02-06
Payer: COMMERCIAL

## 2023-02-06 ENCOUNTER — PATIENT MESSAGE (OUTPATIENT)
Dept: FAMILY MEDICINE | Facility: CLINIC | Age: 58
End: 2023-02-06

## 2023-02-06 DIAGNOSIS — U07.1 COVID: Primary | ICD-10-CM

## 2023-02-06 PROCEDURE — 99421 PR E&M, ONLINE DIGIT, EST, < 7 DAYS, 5-10 MINS: ICD-10-PCS | Mod: S$GLB,,, | Performed by: FAMILY MEDICINE

## 2023-02-06 PROCEDURE — 99421 OL DIG E/M SVC 5-10 MIN: CPT | Mod: S$GLB,,, | Performed by: FAMILY MEDICINE

## 2023-02-06 RX ORDER — PROMETHAZINE HYDROCHLORIDE AND DEXTROMETHORPHAN HYDROBROMIDE 6.25; 15 MG/5ML; MG/5ML
SYRUP ORAL
Qty: 240 ML | Refills: 0 | Status: SHIPPED | OUTPATIENT
Start: 2023-02-06 | End: 2023-03-21

## 2023-02-06 RX ORDER — BENZONATATE 200 MG/1
200 CAPSULE ORAL 3 TIMES DAILY PRN
Qty: 30 CAPSULE | Refills: 1 | Status: SHIPPED | OUTPATIENT
Start: 2023-02-06

## 2023-02-06 NOTE — PROGRESS NOTES
Patient ID: Shonda Quintanilla is a 58 y.o. female.    Chief Complaint: COVID-19 Concerns    The patient initiated a request through [employer] on 2/6/2023 for evaluation and management with a chief complaint of COVID-19 Concerns     I evaluated the questionnaire submission on 2/6/23.    [facility] Peq Evisit Upper Respitatory/Cough Questionnaire    2/6/2023 12:47 PM CST - Filed by Patient   Do you agree to participate in an E-Visit? Yes   If you have any of the following symptoms, please present to your local ER or call 911:  I acknowledge   What is the main issue that you would like for your doctor to address today? Cough, stuffiness body aches   Are you able to take your vital signs? No   What symptoms do you currently have?  Cough;  Fatigue;  Headache;  Nasal Congestion;  Muscle or body aches;  Runny nose;  Sore throat   Have you had a fever? Yes   What has been the range of your fever? 100.4 or greater   When did your symptoms first appear? 2/3/2023   In the last two weeks, have you been in close contact with someone who has COVID-19 or the Flu? Yes , Covid-19   In the last two weeks, have you worked or volunteered in a healthcare facility or as a ? Healthcare facilities include a hospital, medical or dental clinic, long-term care facility, or nursing home Yes   Do you live in a long-term care facility, nursing home, or homeless shelter? No   List what you have done or taken to help your symptoms. Tylenol cold and flu, robitussin, nyquil cold and flu. Tylenol with codiene   How severe are your symptoms? Severe   Have you taken an at home Covid test? Yes   What were the results? Positive   Have you taken a Flu test? No   Have you been fully vaccinated for COVID? (2 Pfizer, 2 Moderna or 1 Abhishek & Abhishek vaccine injections) Yes   Have you received a booster? Yes   Have you recieved a Flu shot? Yes   When did you recieve your Flu shot? 1/9/2023   Do you have transportation to get tested for COVID if it is  indicated and ordered for you at an [facility] location? Yes   Are you currently pregnant, could you be pregnant, or are you breast feeding? None of the above   Provide any information you feel is important to your history not asked above    Please attach any relevant images or files           There are no problems to display for this patient.     Recent Labs Obtained:  No visits with results within 7 Day(s) from this visit.   Latest known visit with results is:   Office Visit on 12/09/2022   Component Date Value Ref Range Status    Microalbumin, Urine 12/09/2022 147.0  ug/mL Final    Creatinine, Urine 12/09/2022 50.0  15.0 - 325.0 mg/dL Final    Microalb/Creat Ratio 12/09/2022 294.0 (H)  0.0 - 30.0 ug/mg Final       Encounter Diagnosis   Name Primary?    COVID Yes        No orders of the defined types were placed in this encounter.     Medications Ordered This Encounter   Medications    benzonatate (TESSALON) 200 MG capsule     Sig: Take 1 capsule (200 mg total) by mouth 3 (three) times daily as needed for Cough.     Dispense:  30 capsule     Refill:  1    nirmatrelvir-ritonavir 300 mg (150 mg x 2)-100 mg copackaged tablets (EUA)     Sig: Take 3 tablets by mouth 2 (two) times daily for 5 days. Each dose contains 2 nirmatrelvir (pink tablets) and 1 ritonavir (white tablet). Take all 3 tablets together     Dispense:  30 tablet     Refill:  0    promethazine-dextromethorphan (PROMETHAZINE-DM) 6.25-15 mg/5 mL Syrp     Sig: Take 10-15ml by mouth every 8 hours as needed for coughing     Dispense:  240 mL     Refill:  0        E-Visit Time Tracking:    Day 1 Time (in minutes): 5     Total Time (in minutes): 5

## 2023-03-01 DIAGNOSIS — E11.9 TYPE 2 DIABETES MELLITUS WITHOUT COMPLICATION, UNSPECIFIED WHETHER LONG TERM INSULIN USE: ICD-10-CM

## 2023-03-16 ENCOUNTER — LAB VISIT (OUTPATIENT)
Dept: LAB | Facility: HOSPITAL | Age: 58
End: 2023-03-16
Attending: FAMILY MEDICINE
Payer: COMMERCIAL

## 2023-03-16 DIAGNOSIS — E11.42 TYPE 2 DIABETES MELLITUS WITH DIABETIC POLYNEUROPATHY, WITHOUT LONG-TERM CURRENT USE OF INSULIN: ICD-10-CM

## 2023-03-16 DIAGNOSIS — E78.5 DYSLIPIDEMIA ASSOCIATED WITH TYPE 2 DIABETES MELLITUS: ICD-10-CM

## 2023-03-16 DIAGNOSIS — Z11.59 NEED FOR HEPATITIS C SCREENING TEST: ICD-10-CM

## 2023-03-16 DIAGNOSIS — Z11.59 NEED FOR HEPATITIS B SCREENING TEST: ICD-10-CM

## 2023-03-16 DIAGNOSIS — E11.69 DYSLIPIDEMIA ASSOCIATED WITH TYPE 2 DIABETES MELLITUS: ICD-10-CM

## 2023-03-16 LAB
CHOLEST SERPL-MCNC: 221 MG/DL (ref 120–199)
CHOLEST/HDLC SERPL: 5.1 {RATIO} (ref 2–5)
HDLC SERPL-MCNC: 43 MG/DL (ref 40–75)
HDLC SERPL: 19.5 % (ref 20–50)
LDLC SERPL CALC-MCNC: 141 MG/DL (ref 63–159)
NONHDLC SERPL-MCNC: 178 MG/DL
TRIGL SERPL-MCNC: 185 MG/DL (ref 30–150)

## 2023-03-16 PROCEDURE — 36415 COLL VENOUS BLD VENIPUNCTURE: CPT | Mod: PO | Performed by: FAMILY MEDICINE

## 2023-03-16 PROCEDURE — 80061 LIPID PANEL: CPT | Performed by: FAMILY MEDICINE

## 2023-03-16 PROCEDURE — 83036 HEMOGLOBIN GLYCOSYLATED A1C: CPT | Performed by: FAMILY MEDICINE

## 2023-03-16 PROCEDURE — 80074 ACUTE HEPATITIS PANEL: CPT | Performed by: FAMILY MEDICINE

## 2023-03-17 LAB
ESTIMATED AVG GLUCOSE: 134 MG/DL (ref 68–131)
HAV IGM SERPL QL IA: NORMAL
HBA1C MFR BLD: 6.3 % (ref 4–5.6)
HBV CORE IGM SERPL QL IA: NORMAL
HBV SURFACE AG SERPL QL IA: NORMAL
HCV AB SERPL QL IA: NORMAL

## 2023-03-21 ENCOUNTER — OFFICE VISIT (OUTPATIENT)
Dept: FAMILY MEDICINE | Facility: CLINIC | Age: 58
End: 2023-03-21
Payer: COMMERCIAL

## 2023-03-21 VITALS
OXYGEN SATURATION: 98 % | HEART RATE: 84 BPM | SYSTOLIC BLOOD PRESSURE: 126 MMHG | DIASTOLIC BLOOD PRESSURE: 84 MMHG | HEIGHT: 63 IN | BODY MASS INDEX: 32.61 KG/M2 | TEMPERATURE: 98 F | WEIGHT: 184.06 LBS

## 2023-03-21 DIAGNOSIS — E11.29 MICROALBUMINURIA DUE TO TYPE 2 DIABETES MELLITUS: ICD-10-CM

## 2023-03-21 DIAGNOSIS — T46.6X5A ADVERSE EFFECT OF HMG-COA REDUCTASE INHIBITOR: ICD-10-CM

## 2023-03-21 DIAGNOSIS — I10 ESSENTIAL HYPERTENSION: ICD-10-CM

## 2023-03-21 DIAGNOSIS — E78.5 DYSLIPIDEMIA ASSOCIATED WITH TYPE 2 DIABETES MELLITUS: ICD-10-CM

## 2023-03-21 DIAGNOSIS — K42.9 UMBILICAL HERNIA WITHOUT OBSTRUCTION AND WITHOUT GANGRENE: ICD-10-CM

## 2023-03-21 DIAGNOSIS — E03.9 HYPOTHYROIDISM, UNSPECIFIED TYPE: ICD-10-CM

## 2023-03-21 DIAGNOSIS — E11.42 TYPE 2 DIABETES MELLITUS WITH DIABETIC POLYNEUROPATHY, WITHOUT LONG-TERM CURRENT USE OF INSULIN: Primary | ICD-10-CM

## 2023-03-21 DIAGNOSIS — E11.69 DYSLIPIDEMIA ASSOCIATED WITH TYPE 2 DIABETES MELLITUS: ICD-10-CM

## 2023-03-21 DIAGNOSIS — M62.08 DIASTASIS RECTI: ICD-10-CM

## 2023-03-21 DIAGNOSIS — R80.9 MICROALBUMINURIA DUE TO TYPE 2 DIABETES MELLITUS: ICD-10-CM

## 2023-03-21 DIAGNOSIS — E66.09 CLASS 1 OBESITY DUE TO EXCESS CALORIES WITH SERIOUS COMORBIDITY AND BODY MASS INDEX (BMI) OF 32.0 TO 32.9 IN ADULT: ICD-10-CM

## 2023-03-21 PROCEDURE — 1160F PR REVIEW ALL MEDS BY PRESCRIBER/CLIN PHARMACIST DOCUMENTED: ICD-10-PCS | Mod: CPTII,S$GLB,, | Performed by: FAMILY MEDICINE

## 2023-03-21 PROCEDURE — 1159F PR MEDICATION LIST DOCUMENTED IN MEDICAL RECORD: ICD-10-PCS | Mod: CPTII,S$GLB,, | Performed by: FAMILY MEDICINE

## 2023-03-21 PROCEDURE — 99215 OFFICE O/P EST HI 40 MIN: CPT | Mod: S$GLB,,, | Performed by: FAMILY MEDICINE

## 2023-03-21 PROCEDURE — 1159F MED LIST DOCD IN RCRD: CPT | Mod: CPTII,S$GLB,, | Performed by: FAMILY MEDICINE

## 2023-03-21 PROCEDURE — 4010F ACE/ARB THERAPY RXD/TAKEN: CPT | Mod: CPTII,S$GLB,, | Performed by: FAMILY MEDICINE

## 2023-03-21 PROCEDURE — 3008F BODY MASS INDEX DOCD: CPT | Mod: CPTII,S$GLB,, | Performed by: FAMILY MEDICINE

## 2023-03-21 PROCEDURE — 99999 PR PBB SHADOW E&M-EST. PATIENT-LVL IV: ICD-10-PCS | Mod: PBBFAC,,, | Performed by: FAMILY MEDICINE

## 2023-03-21 PROCEDURE — 99999 PR PBB SHADOW E&M-EST. PATIENT-LVL IV: CPT | Mod: PBBFAC,,, | Performed by: FAMILY MEDICINE

## 2023-03-21 PROCEDURE — 3044F PR MOST RECENT HEMOGLOBIN A1C LEVEL <7.0%: ICD-10-PCS | Mod: CPTII,S$GLB,, | Performed by: FAMILY MEDICINE

## 2023-03-21 PROCEDURE — 1160F RVW MEDS BY RX/DR IN RCRD: CPT | Mod: CPTII,S$GLB,, | Performed by: FAMILY MEDICINE

## 2023-03-21 PROCEDURE — 99215 PR OFFICE/OUTPT VISIT, EST, LEVL V, 40-54 MIN: ICD-10-PCS | Mod: S$GLB,,, | Performed by: FAMILY MEDICINE

## 2023-03-21 PROCEDURE — 3079F PR MOST RECENT DIASTOLIC BLOOD PRESSURE 80-89 MM HG: ICD-10-PCS | Mod: CPTII,S$GLB,, | Performed by: FAMILY MEDICINE

## 2023-03-21 PROCEDURE — 3044F HG A1C LEVEL LT 7.0%: CPT | Mod: CPTII,S$GLB,, | Performed by: FAMILY MEDICINE

## 2023-03-21 PROCEDURE — 3079F DIAST BP 80-89 MM HG: CPT | Mod: CPTII,S$GLB,, | Performed by: FAMILY MEDICINE

## 2023-03-21 PROCEDURE — 3074F PR MOST RECENT SYSTOLIC BLOOD PRESSURE < 130 MM HG: ICD-10-PCS | Mod: CPTII,S$GLB,, | Performed by: FAMILY MEDICINE

## 2023-03-21 PROCEDURE — 4010F PR ACE/ARB THEARPY RXD/TAKEN: ICD-10-PCS | Mod: CPTII,S$GLB,, | Performed by: FAMILY MEDICINE

## 2023-03-21 PROCEDURE — 3074F SYST BP LT 130 MM HG: CPT | Mod: CPTII,S$GLB,, | Performed by: FAMILY MEDICINE

## 2023-03-21 PROCEDURE — 3008F PR BODY MASS INDEX (BMI) DOCUMENTED: ICD-10-PCS | Mod: CPTII,S$GLB,, | Performed by: FAMILY MEDICINE

## 2023-03-21 RX ORDER — TIRZEPATIDE 2.5 MG/.5ML
2.5 INJECTION, SOLUTION SUBCUTANEOUS
Qty: 4 PEN | Refills: 11 | Status: SHIPPED | OUTPATIENT
Start: 2023-03-21 | End: 2023-05-04

## 2023-03-21 NOTE — PROGRESS NOTES
Assessment & Plan  Type 2 diabetes mellitus with diabetic polyneuropathy, without long-term current use of insulin  -     tirzepatide (MOUNJARO) 2.5 mg/0.5 mL PnIj; Inject 2.5 mg into the skin every 7 days.  Dispense: 4 pen; Refill: 11  -     CBC Auto Differential; Future; Expected date: 03/21/2023  -     Comprehensive Metabolic Panel; Future; Expected date: 03/21/2023  -     Hemoglobin A1C; Future; Expected date: 03/21/2023  -     Lipid Panel; Future; Expected date: 03/21/2023  -     Microalbumin/Creatinine Ratio, Urine; Future; Expected date: 03/21/2023    Labs reviewed - A1c is at goal. Mounjaro refilled. Repeat labs before f/u in 3 mo.    Microalbuminuria due to type 2 diabetes mellitus  -     Microalbumin/Creatinine Ratio, Urine; Future; Expected date: 03/21/2023    On ARB. Diabetes is controlled. Continue routine monitoring.    Dyslipidemia associated with type 2 diabetes mellitus  -     evolocumab (REPATHA SURECLICK) 140 mg/mL PnIj; Inject 1 mL (140 mg total) into the skin every 14 (fourteen) days.  Dispense: 2 mL; Refill: 11  -     Lipid Panel; Future; Expected date: 03/21/2023    Adverse effect of HMG-CoA reductase inhibitor  -     evolocumab (REPATHA SURECLICK) 140 mg/mL PnIj; Inject 1 mL (140 mg total) into the skin every 14 (fourteen) days.  Dispense: 2 mL; Refill: 11    Patient is intolerant to statins. She will not tolerate higher dose of Tricor. Will try to get Repatha covered.    Class 1 obesity due to excess calories with serious comorbidity and body mass index (BMI) of 32.0 to 32.9 in adult  -     tirzepatide (MOUNJARO) 2.5 mg/0.5 mL PnIj; Inject 2.5 mg into the skin every 7 days.  Dispense: 4 pen; Refill: 11  -     Hemoglobin A1C; Future; Expected date: 03/21/2023  -     Lipid Panel; Future; Expected date: 03/21/2023    Losing weight effectively with GLP-1 agonist. Patient to start exercising again. See above.     Essential hypertension  -     CBC Auto Differential; Future; Expected date:  03/21/2023  -     Comprehensive Metabolic Panel; Future; Expected date: 03/21/2023  -     Hemoglobin A1C; Future; Expected date: 03/21/2023  -     Lipid Panel; Future; Expected date: 03/21/2023    Controlled. Continue current therapy.     Hypothyroidism, unspecified type  -     TSH; Future; Expected date: 03/21/2023  -     T4, Free; Future; Expected date: 03/21/2023    Umbilical hernia without obstruction and without gangrene  Diastasis recti  Reassurance.      Health maintenance reviewed.  Patient will defer PNA-20 today.  Declines shingles vaccine.    Follow-up: Follow up in about 6 months (around 9/21/2023).  ______________________________________________________________________    Chief Complaint  Chief Complaint   Patient presents with    Follow-up       HPI  Shonda Quintanilla is a 58 y.o. female with medical diagnoses as listed in the medical history and problem list that presents to the office to follow up on her chronic conditions. She is taking Mounjaro 2.5 mg weekly because Trulicity was on back order, tolerating this well. She has lost 8 lbs since her last visit. She has an umbilical hernia that has gotten more prominent since she lost the weight. She felt some GI upset after taking the higher dose of the Tricor, so she stopped this for a while but resumed it recently without any side effects.     Most recent pertinent workup:     Last CBC Results:   Lab Results   Component Value Date    WBC 9.4 12/02/2022    HGB 13.2 12/02/2022    HCT 39.4 12/02/2022     12/02/2022       Last CMP Results  Lab Results   Component Value Date     12/02/2022    K 4.3 12/02/2022     12/02/2022    CO2 23 12/02/2022    BUN 17 12/02/2022    CREATININE 0.99 12/02/2022    CALCIUM 9.6 12/02/2022    ALBUMIN 4.4 12/02/2022    AST 23 12/02/2022    ALT 26 12/02/2022       Last Lipids  Lab Results   Component Value Date    CHOL 221 (H) 03/16/2023    TRIG 185 (H) 03/16/2023    HDL 43 03/16/2023    LDLCALC 141.0 03/16/2023    The 10-year ASCVD risk score (Yoav PETERSON, et al., 2019) is: 8.8%    Values used to calculate the score:      Age: 58 years      Sex: Female      Is Non- : No      Diabetic: Yes      Tobacco smoker: No      Systolic Blood Pressure: 126 mmHg      Is BP treated: Yes      HDL Cholesterol: 43 mg/dL      Total Cholesterol: 221 mg/dL      Last A1C  Lab Results   Component Value Date    HGBA1C 6.3 (H) 2023         Health Maintenance         Date Due Completion Date    Eye Exam Never done ---    Low Dose Statin Never done ---    Shingles Vaccine (1 of 2) Never done ---    COVID-19 Vaccine (4 - Booster for Pfizer series) 2022    Pneumococcal Vaccines (Age 0-64) (2 - PPSV23 if available, else PCV20) 2022    Hemoglobin A1c 2023 3/16/2023    Mammogram 10/04/2023 10/4/2022    Cervical Cancer Screening 2023    Diabetes Urine Screening 2023    Foot Exam 2023    Override on 2022: Done    Lipid Panel 2024 3/16/2023    Colorectal Cancer Screening 2026    TETANUS VACCINE 2032              PAST MEDICAL HISTORY:  Past Medical History:   Diagnosis Date    Hypertension     Hypothyroid        PAST SURGICAL HISTORY:  Past Surgical History:   Procedure Laterality Date     SECTION, CLASSIC      TUBAL LIGATION      URETHRAL SLING         SOCIAL HISTORY:  Social History     Socioeconomic History    Marital status:     Number of children: 1   Tobacco Use    Smoking status: Never     Passive exposure: Never    Smokeless tobacco: Never   Substance and Sexual Activity    Alcohol use: Yes     Alcohol/week: 2.0 standard drinks     Types: 1 Glasses of wine, 1 Shots of liquor per week     Comment: rare    Drug use: Never    Sexual activity: Yes     Partners: Male     Birth control/protection: None       FAMILY HISTORY:  Family History   Problem Relation Age of Onset    Kidney  disease Mother     Hypertension Mother     Diabetes Mother     Stroke Father     Heart disease Father     Drug abuse Father     Asthma Father     Alcohol abuse Father     Drug abuse Sister     Depression Sister     Birth defects Sister     Mental illness Sister     Hypertension Sister     Drug abuse Sister     Diabetes Sister     Depression Sister     Drug abuse Brother     Drug abuse Brother     Asthma Daughter        ALLERGIES AND MEDICATIONS: updated and reviewed.  Review of patient's allergies indicates:   Allergen Reactions    Statins-hmg-coa reductase inhibitors Other (See Comments)     myalgia     Current Outpatient Medications   Medication Sig Dispense Refill    amlodipine-valsartan (EXFORGE)  mg per tablet Take 1 tablet by mouth once daily. 90 tablet 3    baclofen (LIORESAL) 10 MG tablet Take 10 mg by mouth 2 (two) times daily.      benzonatate (TESSALON) 200 MG capsule Take 1 capsule (200 mg total) by mouth 3 (three) times daily as needed for Cough. 30 capsule 1    ciclopirox (LOPROX) 0.77 % Crea Apply topically.      estradioL (ESTRACE) 0.01 % (0.1 mg/gram) vaginal cream Place 0.5 g vaginally.      fenofibrate (TRICOR) 54 MG tablet Take 1 tablet (54 mg total) by mouth once daily. 90 tablet 3    fluconazole (DIFLUCAN) 150 MG Tab Take 150 mg by mouth.      meloxicam (MOBIC) 15 MG tablet Take 1 tablet (15 mg total) by mouth once daily. 30 tablet 5    metFORMIN (GLUCOPHAGE-XR) 500 MG ER 24hr tablet Take 1000 mg (2 tab) po in the morning and 500 mg (1 tab) po before bed 270 tablet 3    omeprazole (PRILOSEC) 40 MG capsule Take 40 mg by mouth once daily.      SYNTHROID 137 mcg Tab tablet Take 137 mcg by mouth.      triamcinolone (KENALOG) 0.5 % ointment Apply topically.      evolocumab (REPATHA SURECLICK) 140 mg/mL PnIj Inject 1 mL (140 mg total) into the skin every 14 (fourteen) days. 2 mL 11    tirzepatide (MOUNJARO) 2.5 mg/0.5 mL PnIj Inject 2.5 mg into the skin every 7 days. 4 pen 11     No current  "facility-administered medications for this visit.         ROS  Review of Systems   Constitutional:  Negative for activity change, fever and unexpected weight change.   HENT:  Negative for congestion and sore throat.    Eyes:  Negative for photophobia and visual disturbance.   Respiratory:  Negative for cough and shortness of breath.    Cardiovascular:  Negative for chest pain and leg swelling.   Gastrointestinal:  Negative for abdominal pain, constipation, diarrhea, nausea and vomiting.   Endocrine: Negative for polydipsia, polyphagia and polyuria.   Genitourinary:  Negative for dysuria and urgency.   Musculoskeletal:  Negative for arthralgias and gait problem.   Skin:  Negative for color change.   Neurological:  Negative for dizziness, weakness and headaches.   Psychiatric/Behavioral:  Negative for dysphoric mood and sleep disturbance. The patient is not nervous/anxious.          Physical Exam  Vitals:    03/21/23 1426   BP: 126/84   BP Location: Left arm   Patient Position: Sitting   BP Method: Medium (Manual)   Pulse: 84   Temp: 98.1 °F (36.7 °C)   TempSrc: Oral   SpO2: 98%   Weight: 83.5 kg (184 lb 1.4 oz)   Height: 5' 3" (1.6 m)    Body mass index is 32.61 kg/m².  Weight: 83.5 kg (184 lb 1.4 oz)   Height: 5' 3" (160 cm)   Physical Exam  Constitutional:       General: She is not in acute distress.     Appearance: She is obese.   HENT:      Head: Normocephalic and atraumatic.   Abdominal:      Hernia: A hernia is present. Hernia is present in the umbilical area.      Comments: Diastasis recti   Skin:     General: Skin is warm and dry.   Neurological:      Mental Status: She is alert. Mental status is at baseline.   Psychiatric:         Mood and Affect: Mood normal.         Behavior: Behavior normal.           "

## 2023-03-22 ENCOUNTER — SPECIALTY PHARMACY (OUTPATIENT)
Dept: PHARMACY | Facility: CLINIC | Age: 58
End: 2023-03-22

## 2023-03-22 NOTE — TELEPHONE ENCOUNTER
Incoming call from patient. Informed pt [facility] has received an order for Repatha and we are working on getting it covered by her insurance company. Welcome call completed. Patient verbalized understanding.

## 2023-03-22 NOTE — TELEPHONE ENCOUNTER
Repatha order received. PA required.     Joey, this is Dennise Flood with [facility].  We are working on your prescription that your doctor has sent us. We will be working with your insurance to get this approved for you. We will be calling you along the way with updates on your medication.  If you have any questions, you can reach us at [phone].    Welcome call outcome: Left voicemail

## 2023-03-23 ENCOUNTER — PATIENT MESSAGE (OUTPATIENT)
Dept: ADMINISTRATIVE | Facility: HOSPITAL | Age: 58
End: 2023-03-23
Payer: COMMERCIAL

## 2023-03-23 NOTE — TELEPHONE ENCOUNTER
Repatha PA denied due to patient lacking a diagnosis of ASCVD or heterozygous/homozygous familial hypercholesterolemia. Will work on appeal.

## 2023-04-10 ENCOUNTER — PATIENT OUTREACH (OUTPATIENT)
Dept: ADMINISTRATIVE | Facility: HOSPITAL | Age: 58
End: 2023-04-10
Payer: COMMERCIAL

## 2023-04-10 NOTE — LETTER
AUTHORIZATION FOR RELEASE OF   CONFIDENTIAL INFORMATION    Dear Carmela ( Dr. Gallo Maya ),    We are seeing Shonda Quintanilla, date of birth 1965, in the clinic at [facility] FAMILY MED/ INTERNAL MED/ PEDS. Britney Byrd DO is the patient's PCP. Shonda Quintanilla has an outstanding lab/procedure at the time we reviewed her chart. In order to help keep her health information updated, she has authorized us to request the following medical record(s):        (  )  MAMMOGRAM                                      (  )  COLONOSCOPY      (  )  PAP SMEAR                                          (  )  OUTSIDE LAB RESULTS     (  )  DEXA SCAN                                          ( x ) EYE EXAM(diabetic) [id number]           (  )  FOOT EXAM                                          (  )  ENTIRE RECORD     (  )  OUTSIDE IMMUNIZATIONS                 (  )  _______________         Please fax records to [facility], Lyndsey R Adams, DO,  [phone].     If you have any questions, please contact Breonna Castellanos LPN [facility] at   [phone].     Patient Name: Shonda Quintanilla  : 1965  Patient Phone #: [phone]

## 2023-04-10 NOTE — PROGRESS NOTES
[facility] 1 DM EYE 04.03.23 - tried calling patient on home number, number no longer in service. Spoke w/ patient on the cell number, she reports that she had an exam in 2022 at [facility]. A request was sent today for patient's record. She will call to schedule for 2023.    Bone Mineral Density updated.

## 2023-04-11 NOTE — TELEPHONE ENCOUNTER
Outgoing call to [employer] to check status of Repatha appeal. Representative stated appeal has been denied. Requested fax be sent to [facility] to start external appeal process.

## 2023-04-12 NOTE — TELEPHONE ENCOUNTER
Repatha appeal denial received. Outgoing call to inform patient of denial and external appeal process. Patient requested external appeal filing form be sent to e-mail address on file for her to sign and send back.

## 2023-05-04 ENCOUNTER — PATIENT MESSAGE (OUTPATIENT)
Dept: FAMILY MEDICINE | Facility: CLINIC | Age: 58
End: 2023-05-04
Payer: COMMERCIAL

## 2023-05-04 DIAGNOSIS — E11.42 TYPE 2 DIABETES MELLITUS WITH DIABETIC POLYNEUROPATHY, WITHOUT LONG-TERM CURRENT USE OF INSULIN: Primary | ICD-10-CM

## 2023-05-04 RX ORDER — TIRZEPATIDE 5 MG/.5ML
5 INJECTION, SOLUTION SUBCUTANEOUS
Qty: 12 PEN | Refills: 3 | Status: SHIPPED | OUTPATIENT
Start: 2023-05-04 | End: 2023-06-26 | Stop reason: SDUPTHER

## 2023-05-05 NOTE — TELEPHONE ENCOUNTER
Outgoing call to [employer] Appeals dept to check status of Repatha external appeal. Rep stated decision date for external appeal is 6/1/2023.

## 2023-06-06 ENCOUNTER — SPECIALTY PHARMACY (OUTPATIENT)
Dept: PHARMACY | Facility: CLINIC | Age: 58
End: 2023-06-06
Payer: COMMERCIAL

## 2023-06-26 DIAGNOSIS — E11.42 TYPE 2 DIABETES MELLITUS WITH DIABETIC POLYNEUROPATHY, WITHOUT LONG-TERM CURRENT USE OF INSULIN: ICD-10-CM

## 2023-06-26 RX ORDER — TIRZEPATIDE 5 MG/.5ML
5 INJECTION, SOLUTION SUBCUTANEOUS
Qty: 12 PEN | Refills: 3 | Status: SHIPPED | OUTPATIENT
Start: 2023-06-26 | End: 2023-09-26

## 2023-06-26 NOTE — TELEPHONE ENCOUNTER
Care Due:                  Date            Visit Type   Department     Provider  --------------------------------------------------------------------------------                                [facility]                              PRIMARY      MED/ INTERNAL  Last Visit: 03-      CARE ([facility])   MED/ PEDS      Britney Byrd                              [facility]                              PRIMARY      MED/ INTERNAL  Next Visit: 09-      CARE ([facility])   MED/ PEDS      Britney Byrd                                                            Last  Test          Frequency    Reason                     Performed    Due Date  --------------------------------------------------------------------------------    HBA1C.......  6 months...  metFORMIN................  03- 09-    Health [facility] Embedded Care Due Messages. Reference number: [id number].   6/26/2023 1:36:35 PM CDT  
right neck mass

## 2023-07-15 DIAGNOSIS — M79.10 MYALGIA, UNSPECIFIED SITE: ICD-10-CM

## 2023-07-17 RX ORDER — BACLOFEN 10 MG/1
TABLET ORAL
Qty: 60 TABLET | Refills: 5 | Status: SHIPPED | OUTPATIENT
Start: 2023-07-17

## 2023-07-27 ENCOUNTER — PATIENT OUTREACH (OUTPATIENT)
Dept: ADMINISTRATIVE | Facility: HOSPITAL | Age: 58
End: 2023-07-27
Payer: COMMERCIAL

## 2023-07-27 RX ORDER — AMOXICILLIN 500 MG/1
500 CAPSULE ORAL EVERY 12 HOURS
COMMUNITY
Start: 2023-05-26

## 2023-07-27 RX ORDER — IBUPROFEN 800 MG/1
TABLET ORAL
COMMUNITY
Start: 2023-07-15

## 2023-07-27 NOTE — PROGRESS NOTES
[facility] 1 Comm. Gap Report Diabetic Eye Exam 07.18.23 - no 2023 exam, A request was sent today for the patient's 2022 record. Left friendly reminder message for patient to schedule her eye exam.

## 2023-07-31 ENCOUNTER — PATIENT OUTREACH (OUTPATIENT)
Dept: ADMINISTRATIVE | Facility: HOSPITAL | Age: 58
End: 2023-07-31
Payer: COMMERCIAL

## 2023-07-31 RX ORDER — TIRZEPATIDE 2.5 MG/.5ML
INJECTION, SOLUTION SUBCUTANEOUS
COMMUNITY
Start: 2023-04-21 | End: 2023-09-26 | Stop reason: DRUGHIGH

## 2023-07-31 RX ORDER — FLUCONAZOLE 150 MG/1
1 TABLET ORAL EVERY OTHER DAY
COMMUNITY
Start: 2023-05-19 | End: 2023-09-26 | Stop reason: SDUPTHER

## 2023-09-20 LAB
ALBUMIN SERPL-MCNC: 4.4 G/DL (ref 3.6–5.1)
ALBUMIN/CREAT UR: 95 MCG/MG CREAT
ALBUMIN/GLOB SERPL: 1.3 (CALC) (ref 1–2.5)
ALP SERPL-CCNC: 57 U/L (ref 37–153)
ALT SERPL-CCNC: 19 U/L (ref 6–29)
AST SERPL-CCNC: 21 U/L (ref 10–35)
BASOPHILS # BLD AUTO: 89 CELLS/UL (ref 0–200)
BASOPHILS NFR BLD AUTO: 0.9 %
BILIRUB SERPL-MCNC: 0.3 MG/DL (ref 0.2–1.2)
BUN SERPL-MCNC: 21 MG/DL (ref 7–25)
BUN/CREAT SERPL: 16 (CALC) (ref 6–22)
CALCIUM SERPL-MCNC: 10.3 MG/DL (ref 8.6–10.4)
CHLORIDE SERPL-SCNC: 108 MMOL/L (ref 98–110)
CHOLEST SERPL-MCNC: 245 MG/DL
CHOLEST/HDLC SERPL: 5.1 (CALC)
CO2 SERPL-SCNC: 25 MMOL/L (ref 20–32)
CREAT SERPL-MCNC: 1.29 MG/DL (ref 0.5–1.03)
CREAT UR-MCNC: 79 MG/DL (ref 20–275)
EGFR: 48 ML/MIN/1.73M2
EOSINOPHIL # BLD AUTO: 248 CELLS/UL (ref 15–500)
EOSINOPHIL NFR BLD AUTO: 2.5 %
ERYTHROCYTE [DISTWIDTH] IN BLOOD BY AUTOMATED COUNT: 12.8 % (ref 11–15)
GLOBULIN SER CALC-MCNC: 3.3 G/DL (CALC) (ref 1.9–3.7)
GLUCOSE SERPL-MCNC: 113 MG/DL (ref 65–99)
HBA1C MFR BLD: 6.2 % OF TOTAL HGB
HCT VFR BLD AUTO: 37.7 % (ref 35–45)
HDLC SERPL-MCNC: 48 MG/DL
HGB BLD-MCNC: 13.2 G/DL (ref 11.7–15.5)
LDLC SERPL CALC-MCNC: 167 MG/DL (CALC)
LYMPHOCYTES # BLD AUTO: 2574 CELLS/UL (ref 850–3900)
LYMPHOCYTES NFR BLD AUTO: 26 %
MCH RBC QN AUTO: 30.8 PG (ref 27–33)
MCHC RBC AUTO-ENTMCNC: 35 G/DL (ref 32–36)
MCV RBC AUTO: 88.1 FL (ref 80–100)
MICROALBUMIN UR-MCNC: 7.5 MG/DL
MONOCYTES # BLD AUTO: 1079 CELLS/UL (ref 200–950)
MONOCYTES NFR BLD AUTO: 10.9 %
NEUTROPHILS # BLD AUTO: 5910 CELLS/UL (ref 1500–7800)
NEUTROPHILS NFR BLD AUTO: 59.7 %
NONHDLC SERPL-MCNC: 197 MG/DL (CALC)
PLATELET # BLD AUTO: 305 THOUSAND/UL (ref 140–400)
PMV BLD REES-ECKER: 10.5 FL (ref 7.5–12.5)
POTASSIUM SERPL-SCNC: 4.7 MMOL/L (ref 3.5–5.3)
PROT SERPL-MCNC: 7.7 G/DL (ref 6.1–8.1)
RBC # BLD AUTO: 4.28 MILLION/UL (ref 3.8–5.1)
SODIUM SERPL-SCNC: 142 MMOL/L (ref 135–146)
T4 FREE SERPL-MCNC: 1.2 NG/DL (ref 0.8–1.8)
TRIGL SERPL-MCNC: 158 MG/DL
TSH SERPL-ACNC: 4.5 MIU/L (ref 0.4–4.5)
WBC # BLD AUTO: 9.9 THOUSAND/UL (ref 3.8–10.8)

## 2023-09-26 ENCOUNTER — OFFICE VISIT (OUTPATIENT)
Dept: FAMILY MEDICINE | Facility: CLINIC | Age: 58
End: 2023-09-26
Payer: COMMERCIAL

## 2023-09-26 ENCOUNTER — TELEPHONE (OUTPATIENT)
Dept: FAMILY MEDICINE | Facility: CLINIC | Age: 58
End: 2023-09-26
Payer: COMMERCIAL

## 2023-09-26 ENCOUNTER — PATIENT MESSAGE (OUTPATIENT)
Dept: FAMILY MEDICINE | Facility: CLINIC | Age: 58
End: 2023-09-26
Payer: COMMERCIAL

## 2023-09-26 ENCOUNTER — E-CONSULT (OUTPATIENT)
Dept: HEMATOLOGY/ONCOLOGY | Facility: CLINIC | Age: 58
End: 2023-09-26
Payer: COMMERCIAL

## 2023-09-26 ENCOUNTER — PATIENT MESSAGE (OUTPATIENT)
Dept: FAMILY MEDICINE | Facility: CLINIC | Age: 58
End: 2023-09-26

## 2023-09-26 VITALS
HEART RATE: 76 BPM | OXYGEN SATURATION: 96 % | HEIGHT: 63 IN | DIASTOLIC BLOOD PRESSURE: 76 MMHG | BODY MASS INDEX: 32.52 KG/M2 | WEIGHT: 183.56 LBS | TEMPERATURE: 98 F | SYSTOLIC BLOOD PRESSURE: 122 MMHG

## 2023-09-26 DIAGNOSIS — R68.84 JAW PAIN: ICD-10-CM

## 2023-09-26 DIAGNOSIS — I10 ESSENTIAL HYPERTENSION: ICD-10-CM

## 2023-09-26 DIAGNOSIS — R80.9 MICROALBUMINURIA DUE TO TYPE 2 DIABETES MELLITUS: ICD-10-CM

## 2023-09-26 DIAGNOSIS — E78.5 DYSLIPIDEMIA ASSOCIATED WITH TYPE 2 DIABETES MELLITUS: Primary | ICD-10-CM

## 2023-09-26 DIAGNOSIS — E11.42 TYPE 2 DIABETES MELLITUS WITH DIABETIC POLYNEUROPATHY, WITHOUT LONG-TERM CURRENT USE OF INSULIN: ICD-10-CM

## 2023-09-26 DIAGNOSIS — E11.29 MICROALBUMINURIA DUE TO TYPE 2 DIABETES MELLITUS: ICD-10-CM

## 2023-09-26 DIAGNOSIS — D72.828 HIGH BLOOD MONOCYTE COUNT: ICD-10-CM

## 2023-09-26 DIAGNOSIS — E03.9 HYPOTHYROIDISM, UNSPECIFIED TYPE: ICD-10-CM

## 2023-09-26 DIAGNOSIS — Z23 NEEDS FLU SHOT: ICD-10-CM

## 2023-09-26 DIAGNOSIS — E11.69 DYSLIPIDEMIA ASSOCIATED WITH TYPE 2 DIABETES MELLITUS: Primary | ICD-10-CM

## 2023-09-26 DIAGNOSIS — D72.821 MONOCYTOSIS: Primary | ICD-10-CM

## 2023-09-26 DIAGNOSIS — T46.6X5A ADVERSE EFFECT OF HMG-COA REDUCTASE INHIBITOR: ICD-10-CM

## 2023-09-26 DIAGNOSIS — E66.09 CLASS 1 OBESITY DUE TO EXCESS CALORIES WITH SERIOUS COMORBIDITY AND BODY MASS INDEX (BMI) OF 32.0 TO 32.9 IN ADULT: ICD-10-CM

## 2023-09-26 DIAGNOSIS — K21.9 GASTROESOPHAGEAL REFLUX DISEASE, UNSPECIFIED WHETHER ESOPHAGITIS PRESENT: ICD-10-CM

## 2023-09-26 PROCEDURE — 90686 FLU VACCINE (QUAD) GREATER THAN OR EQUAL TO 3YO PRESERVATIVE FREE IM: ICD-10-PCS | Mod: S$GLB,,, | Performed by: FAMILY MEDICINE

## 2023-09-26 PROCEDURE — 99999 PR PBB SHADOW E&M-EST. PATIENT-LVL V: ICD-10-PCS | Mod: PBBFAC,,, | Performed by: FAMILY MEDICINE

## 2023-09-26 PROCEDURE — 3060F POS MICROALBUMINURIA REV: CPT | Mod: CPTII,S$GLB,, | Performed by: FAMILY MEDICINE

## 2023-09-26 PROCEDURE — 3060F PR POS MICROALBUMINURIA RESULT DOCUMENTED/REVIEW: ICD-10-PCS | Mod: CPTII,S$GLB,, | Performed by: FAMILY MEDICINE

## 2023-09-26 PROCEDURE — 90686 IIV4 VACC NO PRSV 0.5 ML IM: CPT | Mod: S$GLB,,, | Performed by: FAMILY MEDICINE

## 2023-09-26 PROCEDURE — 99417 PROLNG OP E/M EACH 15 MIN: CPT | Mod: S$GLB,,, | Performed by: FAMILY MEDICINE

## 2023-09-26 PROCEDURE — 1160F PR REVIEW ALL MEDS BY PRESCRIBER/CLIN PHARMACIST DOCUMENTED: ICD-10-PCS | Mod: CPTII,S$GLB,, | Performed by: FAMILY MEDICINE

## 2023-09-26 PROCEDURE — 3044F HG A1C LEVEL LT 7.0%: CPT | Mod: CPTII,S$GLB,, | Performed by: FAMILY MEDICINE

## 2023-09-26 PROCEDURE — 3074F PR MOST RECENT SYSTOLIC BLOOD PRESSURE < 130 MM HG: ICD-10-PCS | Mod: CPTII,S$GLB,, | Performed by: FAMILY MEDICINE

## 2023-09-26 PROCEDURE — 99215 OFFICE O/P EST HI 40 MIN: CPT | Mod: 25,S$GLB,, | Performed by: FAMILY MEDICINE

## 2023-09-26 PROCEDURE — 90471 FLU VACCINE (QUAD) GREATER THAN OR EQUAL TO 3YO PRESERVATIVE FREE IM: ICD-10-PCS | Mod: S$GLB,,, | Performed by: FAMILY MEDICINE

## 2023-09-26 PROCEDURE — 99451 NTRPROF PH1/NTRNET/EHR 5/>: CPT | Mod: S$GLB,,, | Performed by: INTERNAL MEDICINE

## 2023-09-26 PROCEDURE — 99999 PR PBB SHADOW E&M-EST. PATIENT-LVL V: CPT | Mod: PBBFAC,,, | Performed by: FAMILY MEDICINE

## 2023-09-26 PROCEDURE — 3008F BODY MASS INDEX DOCD: CPT | Mod: CPTII,S$GLB,, | Performed by: FAMILY MEDICINE

## 2023-09-26 PROCEDURE — 4010F ACE/ARB THERAPY RXD/TAKEN: CPT | Mod: CPTII,S$GLB,, | Performed by: FAMILY MEDICINE

## 2023-09-26 PROCEDURE — 4010F PR ACE/ARB THEARPY RXD/TAKEN: ICD-10-PCS | Mod: CPTII,S$GLB,, | Performed by: FAMILY MEDICINE

## 2023-09-26 PROCEDURE — 1160F RVW MEDS BY RX/DR IN RCRD: CPT | Mod: CPTII,S$GLB,, | Performed by: FAMILY MEDICINE

## 2023-09-26 PROCEDURE — 3008F PR BODY MASS INDEX (BMI) DOCUMENTED: ICD-10-PCS | Mod: CPTII,S$GLB,, | Performed by: FAMILY MEDICINE

## 2023-09-26 PROCEDURE — 1159F MED LIST DOCD IN RCRD: CPT | Mod: CPTII,S$GLB,, | Performed by: FAMILY MEDICINE

## 2023-09-26 PROCEDURE — 3078F DIAST BP <80 MM HG: CPT | Mod: CPTII,S$GLB,, | Performed by: FAMILY MEDICINE

## 2023-09-26 PROCEDURE — 99215 PR OFFICE/OUTPT VISIT, EST, LEVL V, 40-54 MIN: ICD-10-PCS | Mod: 25,S$GLB,, | Performed by: FAMILY MEDICINE

## 2023-09-26 PROCEDURE — 99417 PR PROLONGED SVC, OUTPT, W/WO DIRECT PT CONTACT,  EA ADDTL 15 MIN: ICD-10-PCS | Mod: S$GLB,,, | Performed by: FAMILY MEDICINE

## 2023-09-26 PROCEDURE — 90471 IMMUNIZATION ADMIN: CPT | Mod: S$GLB,,, | Performed by: FAMILY MEDICINE

## 2023-09-26 PROCEDURE — 3074F SYST BP LT 130 MM HG: CPT | Mod: CPTII,S$GLB,, | Performed by: FAMILY MEDICINE

## 2023-09-26 PROCEDURE — 3066F NEPHROPATHY DOC TX: CPT | Mod: CPTII,S$GLB,, | Performed by: FAMILY MEDICINE

## 2023-09-26 PROCEDURE — 3078F PR MOST RECENT DIASTOLIC BLOOD PRESSURE < 80 MM HG: ICD-10-PCS | Mod: CPTII,S$GLB,, | Performed by: FAMILY MEDICINE

## 2023-09-26 PROCEDURE — 99451 PR INTERPROF, PHONE/INTERNET/EHR, CONSULT, >= 5MINS: ICD-10-PCS | Mod: S$GLB,,, | Performed by: INTERNAL MEDICINE

## 2023-09-26 PROCEDURE — 1159F PR MEDICATION LIST DOCUMENTED IN MEDICAL RECORD: ICD-10-PCS | Mod: CPTII,S$GLB,, | Performed by: FAMILY MEDICINE

## 2023-09-26 PROCEDURE — 3066F PR DOCUMENTATION OF TREATMENT FOR NEPHROPATHY: ICD-10-PCS | Mod: CPTII,S$GLB,, | Performed by: FAMILY MEDICINE

## 2023-09-26 PROCEDURE — 3044F PR MOST RECENT HEMOGLOBIN A1C LEVEL <7.0%: ICD-10-PCS | Mod: CPTII,S$GLB,, | Performed by: FAMILY MEDICINE

## 2023-09-26 RX ORDER — DEXTROSE 4 G
TABLET,CHEWABLE ORAL
Qty: 1 EACH | Refills: 0 | Status: SHIPPED | OUTPATIENT
Start: 2023-09-26

## 2023-09-26 RX ORDER — AMLODIPINE AND VALSARTAN 10; 320 MG/1; MG/1
1 TABLET ORAL DAILY
Qty: 90 TABLET | Refills: 3 | Status: SHIPPED | OUTPATIENT
Start: 2023-09-26 | End: 2024-09-25

## 2023-09-26 RX ORDER — CARISOPRODOL 350 MG/1
175 TABLET ORAL NIGHTLY PRN
COMMUNITY
Start: 2023-08-01

## 2023-09-26 RX ORDER — METFORMIN HYDROCHLORIDE 1000 MG/1
1000 TABLET ORAL EVERY MORNING
COMMUNITY
End: 2023-12-15 | Stop reason: ALTCHOICE

## 2023-09-26 RX ORDER — LEVOTHYROXINE SODIUM 137 UG/1
137 TABLET ORAL
Qty: 90 TABLET | Refills: 3 | Status: SHIPPED | OUTPATIENT
Start: 2023-09-26 | End: 2024-09-25

## 2023-09-26 RX ORDER — LANCETS 33 GAUGE
EACH MISCELLANEOUS
Qty: 200 EACH | Refills: 11 | Status: SHIPPED | OUTPATIENT
Start: 2023-09-26

## 2023-09-26 RX ORDER — FENOFIBRATE 54 MG/1
54 TABLET ORAL DAILY
Qty: 90 TABLET | Refills: 3 | Status: SHIPPED | OUTPATIENT
Start: 2023-09-26 | End: 2024-09-25

## 2023-09-26 NOTE — CONSULTS
[facility] - Hem Onc 3rd Fl  Response for E-Consult     Patient Name: Shonda Quintanilla  MRN: [id number]  Primary Care Provider: Britney Byrd DO   Requesting Provider: Britney Byrd DO  E-Consult to [facility]  Consult performed by: Veronique Wright MD  Consult ordered by: Britney Byrd DO  Reason for consult: Monocytosis  Assessment/Recommendations: See Consult           Recommendation: I agree with peripheral smear. Repeat cbc, retic, LDH.  Rule out infection, inflammation, splenectomy, steroid use etc.   It is reassuring that she has had very stable mild monocytosis for about 15 years.     Contingency: Please refer for an in person visit if the smear is abnormal, or if monocytes continue to rise in which case she would need a flow cytometry and a bone marrow biopsy.     Total time of Consultation: 10 minute    I did not speak to the requesting provider verbally about this.     *This eConsult is based on the clinical data available to me and is furnished without benefit of a physical examination. The eConsult will need to be interpreted in light of any clinical issues or changes in patient status not available to me at the time of filing this eConsults. Significant changes in patient condition or level of acuity should result in immediate formal consultation and reevaluation. Please alert me if you have further questions.    Thank you for this eConsult referral.     Veronique Wright MD  [facility] - Hem Onc [facility]

## 2023-09-26 NOTE — Clinical Note
This patient needs a dilated eye exam. She does not have vision insurance. Are the eye photos under regular health insurance or visual?

## 2023-09-26 NOTE — TELEPHONE ENCOUNTER
Pt notified of doctor's instructions below. Pt verbalized understanding. Pt requests that lab orders be sent to [facility].

## 2023-09-26 NOTE — PROGRESS NOTES
Assessment & Plan:    Dyslipidemia associated with type 2 diabetes mellitus  -     fenofibrate (TRICOR) 54 MG tablet; Take 1 tablet (54 mg total) by mouth once daily.  Dispense: 90 tablet; Refill: 3    Adverse effect of HMG-CoA reductase inhibitor  Discussed ASCVD risk with patient. Strongly encouraged her to start Repatha. Will contact [facility] for refill. Tricor was refilled.     High blood monocyte count  -     E-Consult to Hemonc  -     Pathologist Interpretation Differential; Future; Expected date: 09/26/2023  -     RETICULOCYTES; Future; Expected date: 09/26/2023  -     LACTATE DEHYDROGENASE; Future; Expected date: 09/26/2023  -     CBC Auto Differential; Future; Expected date: 09/26/2023    E-consult submitted to Hematology. Recommended peripheral smear, repeat CBC, LDH, and reticulocyte count. Refer to Hematology if monocytes continues to climb.     Jaw pain  -     Ambulatory referral/consult to ENT; Future; Expected date: 10/03/2023    Advised referral to ENT through [facility]. Consider CT facial bones for further eval.    Type 2 diabetes mellitus with diabetic polyneuropathy, without long-term current use of insulin  -     tirzepatide 7.5 mg/0.5 mL PnIj; Inject 7.5 mg into the skin every 7 days.  Dispense: 12 pen ; Refill: 3  -     blood-glucose meter Misc; To check BG once daily.  Dispense: 1 each; Refill: 0  -     lancets 33 gauge Misc; To check BG once daily.  Dispense: 200 each; Refill: 11  -     blood sugar diagnostic Strp; To check BG once daily  Dispense: 200 each; Refill: 11    Controlled. Glucometer and supplies sent to pharmacy.    Microalbuminuria due to type 2 diabetes mellitus  Improving. Continue ARB.    Class 1 obesity due to excess calories with serious comorbidity and body mass index (BMI) of 32.0 to 32.9 in adult  -     tirzepatide 7.5 mg/0.5 mL PnIj; Inject 7.5 mg into the skin every 7 days.  Dispense: 12 pen ; Refill: 3    Increase Mounjaro to 7.5 mg.    Essential hypertension  -      amlodipine-valsartan (EXFORGE)  mg per tablet; Take 1 tablet by mouth once daily.  Dispense: 90 tablet; Refill: 3    Controlled. Medication(s) refilled.     Hypothyroidism, unspecified type  -     SYNTHROID 137 mcg Tab tablet; Take 1 tablet (137 mcg total) by mouth before breakfast.  Dispense: 90 tablet; Refill: 3    Controlled. Medication(s) refilled.     Gastroesophageal reflux disease, unspecified whether esophagitis present  -     Ambulatory referral/consult to Gastroenterology; Future; Expected date: 10/03/2023    Patient is due for a colonoscopy. She reports longstanding GERD and may need an EGD. Referral to GI since her previous specialist has retired.    Needs flu shot  -     Influenza - Quadrivalent *Preferred* (6 months+) (PF)    Patient is due for a dilated eye exam but does not have vision insurance. She was encouraged to schedule this at another time.   Patient is due for another mammogram, which she would like to get at the same time as her well woman visit.     Follow-up: Follow up in about 6 months (around 3/26/2024).    Greater than 1 hour spent in the care for this patient.   ______________________________________________________________________    Chief Complaint  Chief Complaint   Patient presents with    Hyperlipidemia       HPI  Shonda Quintanilla is a 58 y.o. female with medical diagnoses as listed in the medical history and problem list that presents to the office to follow up on her chronic conditions. She was prescribed Repatha and it was approved by her insurance but she did not start taking it. She has hit a plateau with her weight loss with the 5 mg of Mounjaro. It causes constipation but this is manageable. She was episodes of hypoglycemia in the middle of the night and in the morning, so she stopped her afternoon dose of metformin. She has been experiencing right-sided ear and jaw pain for which she saw an ENT on the [place] who did a steroid injection to the TMJ. She had some  relief but her discomfort persists.     Most recent pertinent workup:     Last CBC Results:   Lab Results   Component Value Date    WBC 9.9 2023    HGB 13.2 2023    HCT 37.7 2023     2023       Last CMP Results  Lab Results   Component Value Date     2023    K 4.7 2023     2023    CO2 25 2023    BUN 21 2023    CREATININE 1.29 (H) 2023    CALCIUM 10.3 2023    ALBUMIN 4.4 2023    AST 21 2023    ALT 19 2023       Last Lipids  Lab Results   Component Value Date    CHOL 245 (H) 2023    TRIG 158 (H) 2023    HDL 48 (L) 2023    LDLCALC 167 (H) 2023   The 10-year ASCVD risk score (Yoav PETERSON, et al., 2019) is: 8.3%    Values used to calculate the score:      Age: 58 years      Sex: Female      Is Non- : No      Diabetic: Yes      Tobacco smoker: No      Systolic Blood Pressure: 122 mmHg      Is BP treated: Yes      HDL Cholesterol: 48 mg/dL      Total Cholesterol: 245 mg/dL      Last A1C  Lab Results   Component Value Date    HGBA1C 6.2 (H) 2023         Health Maintenance         Date Due Completion Date    Shingles Vaccine (1 of 2) Never done ---    COVID-19 Vaccine (5 - Pfizer series) 2022    Pneumococcal Vaccines (Age 0-64) (2 - PPSV23 or PCV20) 2022    Eye Exam 2023 3/17/2022    Mammogram 10/04/2023 10/4/2022    Foot Exam 2023    Hemoglobin A1c 2024    Diabetes Urine Screening 2024    Lipid Panel 2024    Cervical Cancer Screening 2025    Colorectal Cancer Screening 2026    TETANUS VACCINE 2032              PAST MEDICAL HISTORY:  Past Medical History:   Diagnosis Date    Hypertension     Hypothyroid        PAST SURGICAL HISTORY:  Past Surgical History:   Procedure Laterality Date     SECTION, CLASSIC      TUBAL  LIGATION      URETHRAL SLING         SOCIAL HISTORY:  Social History     Socioeconomic History    Marital status:     Number of children: 1   Tobacco Use    Smoking status: Never     Passive exposure: Never    Smokeless tobacco: Never   Substance and Sexual Activity    Alcohol use: Yes     Alcohol/week: 2.0 standard drinks of alcohol     Types: 1 Glasses of wine, 1 Shots of liquor per week     Comment: rare    Drug use: Never    Sexual activity: Yes     Partners: Male     Birth control/protection: None       FAMILY HISTORY:  Family History   Problem Relation Age of Onset    Kidney disease Mother     Hypertension Mother     Diabetes Mother     Stroke Father     Heart disease Father     Drug abuse Father     Asthma Father     Alcohol abuse Father     Drug abuse Sister     Depression Sister     Birth defects Sister     Mental illness Sister     Hypertension Sister     Drug abuse Sister     Diabetes Sister     Depression Sister     Drug abuse Brother     Drug abuse Brother     Asthma Daughter        ALLERGIES AND MEDICATIONS: updated and reviewed.  Review of patient's allergies indicates:   Allergen Reactions    Statins-hmg-coa reductase inhibitors Other (See Comments)     myalgia     Current Outpatient Medications   Medication Sig Dispense Refill    baclofen (LIORESAL) 10 MG tablet TAKE ONE TABLET BY MOUTH TWICE DAILY 60 tablet 5    benzonatate (TESSALON) 200 MG capsule Take 1 capsule (200 mg total) by mouth 3 (three) times daily as needed for Cough. 30 capsule 1    carisoprodoL (SOMA) 350 MG tablet Take 175 mg by mouth nightly as needed.      ciclopirox (LOPROX) 0.77 % Crea Apply topically.      estradioL (ESTRACE) 0.01 % (0.1 mg/gram) vaginal cream Place 0.5 g vaginally.      evolocumab (REPATHA SURECLICK) 140 mg/mL PnIj Inject 1 mL (140 mg total) into the skin every 14 (fourteen) days. 2 mL 11    fluconazole (DIFLUCAN) 150 MG Tab Take 150 mg by mouth.      ibuprofen (ADVIL,MOTRIN) 800 MG tablet TAKE ONE  "TABLET BY MOUTH TWO OR THREE TIMES DAILY      meloxicam (MOBIC) 15 MG tablet Take 1 tablet (15 mg total) by mouth once daily. 30 tablet 5    metFORMIN (GLUCOPHAGE) 1000 MG tablet Take 1,000 mg by mouth every morning.      omeprazole (PRILOSEC) 40 MG capsule Take 40 mg by mouth once daily.      triamcinolone (KENALOG) 0.5 % ointment Apply topically.      amlodipine-valsartan (EXFORGE)  mg per tablet Take 1 tablet by mouth once daily. 90 tablet 3    amoxicillin (AMOXIL) 500 MG capsule Take 500 mg by mouth every 12 (twelve) hours.      blood sugar diagnostic Strp To check BG once daily 200 each 11    blood-glucose meter Misc To check BG once daily. 1 each 0    fenofibrate (TRICOR) 54 MG tablet Take 1 tablet (54 mg total) by mouth once daily. 90 tablet 3    lancets 33 gauge Misc To check BG once daily. 200 each 11    SYNTHROID 137 mcg Tab tablet Take 1 tablet (137 mcg total) by mouth before breakfast. 90 tablet 3    tirzepatide 7.5 mg/0.5 mL PnIj Inject 7.5 mg into the skin every 7 days. 12 pen 3     No current facility-administered medications for this visit.         ROS  Review of Systems   Constitutional:  Negative for activity change and unexpected weight change.   HENT:  Positive for ear pain.    Gastrointestinal:  Positive for constipation.   Neurological:  Positive for dizziness (when blood sugar is around 100).           Physical Exam  Vitals:    09/26/23 1429   BP: 122/76   BP Location: Right arm   Patient Position: Sitting   BP Method: Large (Manual)   Pulse: 76   Temp: 98.3 °F (36.8 °C)   TempSrc: Oral   SpO2: 96%   Weight: 83.3 kg (183 lb 8.5 oz)   Height: 5' 3" (1.6 m)    Body mass index is 32.51 kg/m².  Weight: 83.3 kg (183 lb 8.5 oz)   Height: 5' 3" (160 cm)   Physical Exam  Constitutional:       General: She is not in acute distress.     Appearance: Normal appearance.   HENT:      Head: Normocephalic and atraumatic.   Neck:      Thyroid: No thyromegaly.      Vascular: No carotid bruit. "   Cardiovascular:      Rate and Rhythm: Normal rate and regular rhythm.      Pulses: Normal pulses.      Heart sounds: Normal heart sounds.   Pulmonary:      Effort: Pulmonary effort is normal. No respiratory distress.      Breath sounds: Normal breath sounds.   Musculoskeletal:      Cervical back: Neck supple. No muscular tenderness.      Right lower leg: No edema.      Left lower leg: No edema.   Lymphadenopathy:      Cervical: No cervical adenopathy.   Skin:     General: Skin is warm and dry.      Findings: No rash.   Neurological:      General: No focal deficit present.      Mental Status: She is alert and oriented to person, place, and time.   Psychiatric:         Mood and Affect: Mood normal.         Behavior: Behavior normal.         Thought Content: Thought content normal.

## 2023-09-26 NOTE — TELEPHONE ENCOUNTER
----- Message from Britney Byrd DO sent at 9/26/2023  5:27 PM CDT -----  Regarding: Labs  Please call patient and inform her that the Hematologist agrees with getting a peripheral smear. She also recommended a repeat CBC, reticulocyte count, and LDH. These are non-fasting labs, so she can have them done anytime. If her monocyte level continues to climb, then she will need an in person referral to Hematology.

## 2023-09-27 PROBLEM — E78.5 HYPERLIPIDEMIA: Status: ACTIVE | Noted: 2023-09-27

## 2023-10-04 LAB
BASOPHILS # BLD AUTO: 66 CELLS/UL (ref 0–200)
BASOPHILS NFR BLD AUTO: 0.7 %
EOSINOPHIL # BLD AUTO: 282 CELLS/UL (ref 15–500)
EOSINOPHIL NFR BLD AUTO: 3 %
ERYTHROCYTE [DISTWIDTH] IN BLOOD BY AUTOMATED COUNT: 13 % (ref 11–15)
HCT VFR BLD AUTO: 39.9 % (ref 35–45)
HGB BLD-MCNC: 13.1 G/DL (ref 11.7–15.5)
LDH SERPL P TO L-CCNC: 129 U/L (ref 120–250)
LYMPHOCYTES # BLD AUTO: 2801 CELLS/UL (ref 850–3900)
LYMPHOCYTES NFR BLD AUTO: 29.8 %
MCH RBC QN AUTO: 29.5 PG (ref 27–33)
MCHC RBC AUTO-ENTMCNC: 32.8 G/DL (ref 32–36)
MCV RBC AUTO: 89.9 FL (ref 80–100)
MONOCYTES # BLD AUTO: 1100 CELLS/UL (ref 200–950)
MONOCYTES NFR BLD AUTO: 11.7 %
NEUTROPHILS # BLD AUTO: 5151 CELLS/UL (ref 1500–7800)
NEUTROPHILS NFR BLD AUTO: 54.8 %
PATH REV BLD -IMP: NORMAL
PLATELET # BLD AUTO: 310 THOUSAND/UL (ref 140–400)
PMV BLD REES-ECKER: 10.6 FL (ref 7.5–12.5)
RBC # BLD AUTO: 4.44 MILLION/UL (ref 3.8–5.1)
RETICS #: NORMAL CELLS/UL (ref 20000–80000)
RETICS/RBC NFR AUTO: 1.2 %
WBC # BLD AUTO: 9.4 THOUSAND/UL (ref 3.8–10.8)

## 2023-10-11 DIAGNOSIS — Z12.31 OTHER SCREENING MAMMOGRAM: ICD-10-CM

## 2023-10-19 ENCOUNTER — PATIENT MESSAGE (OUTPATIENT)
Dept: FAMILY MEDICINE | Facility: CLINIC | Age: 58
End: 2023-10-19
Payer: COMMERCIAL

## 2023-10-19 DIAGNOSIS — D72.821 MONOCYTOSIS: Primary | ICD-10-CM

## 2023-10-20 ENCOUNTER — TELEPHONE (OUTPATIENT)
Dept: HEMATOLOGY/ONCOLOGY | Facility: CLINIC | Age: 58
End: 2023-10-20
Payer: COMMERCIAL

## 2023-10-20 NOTE — TELEPHONE ENCOUNTER
Tc attempted to schedule patient appointment from referral No response Message left on VM for patient to contact office for assistance w/ scheduling     10-23     TC to tp to schedule appt   Pt stated she is undergoing cardiac test at this time and will call us back when she is ready to be seen in clinic  Phone number given to patient   She chooses not to look into anything else at this time

## 2023-11-03 ENCOUNTER — CLINICAL SUPPORT (OUTPATIENT)
Dept: FAMILY MEDICINE | Facility: CLINIC | Age: 58
End: 2023-11-03
Payer: COMMERCIAL

## 2023-11-03 VITALS — TEMPERATURE: 98 F

## 2023-11-03 DIAGNOSIS — Z23 NEED FOR PNEUMOCOCCAL 20-VALENT CONJUGATE VACCINATION: Primary | ICD-10-CM

## 2023-11-03 PROCEDURE — 90471 IMMUNIZATION ADMIN: CPT | Mod: S$GLB,,, | Performed by: FAMILY MEDICINE

## 2023-11-03 PROCEDURE — 90471 PNEUMOCOCCAL CONJUGATE VACCINE 20-VALENT: ICD-10-PCS | Mod: S$GLB,,, | Performed by: FAMILY MEDICINE

## 2023-11-03 PROCEDURE — 90677 PCV20 VACCINE IM: CPT | Mod: S$GLB,,, | Performed by: FAMILY MEDICINE

## 2023-11-03 PROCEDURE — 99999 PR PBB SHADOW E&M-EST. PATIENT-LVL II: ICD-10-PCS | Mod: PBBFAC,,,

## 2023-11-03 PROCEDURE — 99999 PR PBB SHADOW E&M-EST. PATIENT-LVL II: CPT | Mod: PBBFAC,,,

## 2023-11-03 PROCEDURE — 90677 PNEUMOCOCCAL CONJUGATE VACCINE 20-VALENT: ICD-10-PCS | Mod: S$GLB,,, | Performed by: FAMILY MEDICINE

## 2023-11-06 ENCOUNTER — TELEPHONE (OUTPATIENT)
Dept: PHARMACY | Facility: CLINIC | Age: 58
End: 2023-11-06
Payer: COMMERCIAL

## 2023-11-17 ENCOUNTER — OFFICE VISIT (OUTPATIENT)
Dept: GASTROENTEROLOGY | Facility: CLINIC | Age: 58
End: 2023-11-17
Payer: COMMERCIAL

## 2023-11-17 VITALS
DIASTOLIC BLOOD PRESSURE: 85 MMHG | BODY MASS INDEX: 31.95 KG/M2 | WEIGHT: 180.31 LBS | HEART RATE: 89 BPM | HEIGHT: 63 IN | SYSTOLIC BLOOD PRESSURE: 133 MMHG

## 2023-11-17 DIAGNOSIS — K21.9 GASTROESOPHAGEAL REFLUX DISEASE, UNSPECIFIED WHETHER ESOPHAGITIS PRESENT: ICD-10-CM

## 2023-11-17 PROCEDURE — 99203 OFFICE O/P NEW LOW 30 MIN: CPT | Mod: S$GLB,,, | Performed by: INTERNAL MEDICINE

## 2023-11-17 PROCEDURE — 99203 PR OFFICE/OUTPT VISIT, NEW, LEVL III, 30-44 MIN: ICD-10-PCS | Mod: S$GLB,,, | Performed by: INTERNAL MEDICINE

## 2023-11-17 PROCEDURE — 3060F PR POS MICROALBUMINURIA RESULT DOCUMENTED/REVIEW: ICD-10-PCS | Mod: CPTII,S$GLB,, | Performed by: INTERNAL MEDICINE

## 2023-11-17 PROCEDURE — 3008F BODY MASS INDEX DOCD: CPT | Mod: CPTII,S$GLB,, | Performed by: INTERNAL MEDICINE

## 2023-11-17 PROCEDURE — 3075F SYST BP GE 130 - 139MM HG: CPT | Mod: CPTII,S$GLB,, | Performed by: INTERNAL MEDICINE

## 2023-11-17 PROCEDURE — 3066F NEPHROPATHY DOC TX: CPT | Mod: CPTII,S$GLB,, | Performed by: INTERNAL MEDICINE

## 2023-11-17 PROCEDURE — 99999 PR PBB SHADOW E&M-EST. PATIENT-LVL IV: ICD-10-PCS | Mod: PBBFAC,,, | Performed by: INTERNAL MEDICINE

## 2023-11-17 PROCEDURE — 3044F PR MOST RECENT HEMOGLOBIN A1C LEVEL <7.0%: ICD-10-PCS | Mod: CPTII,S$GLB,, | Performed by: INTERNAL MEDICINE

## 2023-11-17 PROCEDURE — 3079F DIAST BP 80-89 MM HG: CPT | Mod: CPTII,S$GLB,, | Performed by: INTERNAL MEDICINE

## 2023-11-17 PROCEDURE — 1159F MED LIST DOCD IN RCRD: CPT | Mod: CPTII,S$GLB,, | Performed by: INTERNAL MEDICINE

## 2023-11-17 PROCEDURE — 3079F PR MOST RECENT DIASTOLIC BLOOD PRESSURE 80-89 MM HG: ICD-10-PCS | Mod: CPTII,S$GLB,, | Performed by: INTERNAL MEDICINE

## 2023-11-17 PROCEDURE — 3060F POS MICROALBUMINURIA REV: CPT | Mod: CPTII,S$GLB,, | Performed by: INTERNAL MEDICINE

## 2023-11-17 PROCEDURE — 3044F HG A1C LEVEL LT 7.0%: CPT | Mod: CPTII,S$GLB,, | Performed by: INTERNAL MEDICINE

## 2023-11-17 PROCEDURE — 4010F PR ACE/ARB THEARPY RXD/TAKEN: ICD-10-PCS | Mod: CPTII,S$GLB,, | Performed by: INTERNAL MEDICINE

## 2023-11-17 PROCEDURE — 3066F PR DOCUMENTATION OF TREATMENT FOR NEPHROPATHY: ICD-10-PCS | Mod: CPTII,S$GLB,, | Performed by: INTERNAL MEDICINE

## 2023-11-17 PROCEDURE — 3075F PR MOST RECENT SYSTOLIC BLOOD PRESS GE 130-139MM HG: ICD-10-PCS | Mod: CPTII,S$GLB,, | Performed by: INTERNAL MEDICINE

## 2023-11-17 PROCEDURE — 99999 PR PBB SHADOW E&M-EST. PATIENT-LVL IV: CPT | Mod: PBBFAC,,, | Performed by: INTERNAL MEDICINE

## 2023-11-17 PROCEDURE — 3008F PR BODY MASS INDEX (BMI) DOCUMENTED: ICD-10-PCS | Mod: CPTII,S$GLB,, | Performed by: INTERNAL MEDICINE

## 2023-11-17 PROCEDURE — 4010F ACE/ARB THERAPY RXD/TAKEN: CPT | Mod: CPTII,S$GLB,, | Performed by: INTERNAL MEDICINE

## 2023-11-17 PROCEDURE — 1159F PR MEDICATION LIST DOCUMENTED IN MEDICAL RECORD: ICD-10-PCS | Mod: CPTII,S$GLB,, | Performed by: INTERNAL MEDICINE

## 2023-11-17 RX ORDER — OMEGA-3 FATTY ACIDS 1000 MG
2 CAPSULE ORAL DAILY
COMMUNITY

## 2023-11-17 RX ORDER — OMEPRAZOLE 20 MG/1
20 CAPSULE, DELAYED RELEASE ORAL DAILY
Qty: 30 CAPSULE | Refills: 11 | Status: SHIPPED | OUTPATIENT
Start: 2023-11-17 | End: 2024-11-16

## 2023-11-17 RX ORDER — ICOSAPENT ETHYL 1000 MG/1
2 CAPSULE ORAL 2 TIMES DAILY
COMMUNITY

## 2023-11-17 RX ORDER — CHROMIUM PICOLINATE 200 MCG
1 TABLET ORAL DAILY
COMMUNITY

## 2023-11-17 NOTE — PROGRESS NOTES
"  [facility] Note      CC: acid reflux    HPI 58 y.o. female who presents with chronic, intermittent, burning acid reflux with rare vomiting.  In the past Omeprazole helped but it was stopped by another provider.  Currently she is taking Pepcid complete with breakthrough symptoms.    She is taking mounjaro for her DM.  She has had some constipation since then improved with fiber.    Past Medical History  Past Medical History:   Diagnosis Date    Hypertension     Hypothyroid      Past Surgical History  Tubal ligation  Sling procedure  C section    No pertinent family history of colon cancer      Physical Examination  /85   Pulse 89   Ht 5' 3" (1.6 m)   Wt 81.8 kg (180 lb 5.4 oz)   BMI 31.95 kg/m²   General appearance: alert, cooperative, no distress  HENT: Normocephalic, atraumatic, neck symmetrical, no nasal discharge   Abdomen: soft, non-tender; non distended, no rebound or guarding  Neurologic: Alert and oriented X 3, moving all four extremities, intact sensation to light touch    Labs:  Lab Results   Component Value Date    WBC 9.4 10/03/2023    HGB 13.1 10/03/2023    HCT 39.9 10/03/2023    MCV 89.9 10/03/2023     10/03/2023         CMP  Sodium   Date Value Ref Range Status   09/19/2023 142 135 - 146 mmol/L Final     Potassium   Date Value Ref Range Status   09/19/2023 4.7 3.5 - 5.3 mmol/L Final     Chloride   Date Value Ref Range Status   09/19/2023 108 98 - 110 mmol/L Final     CO2   Date Value Ref Range Status   09/19/2023 25 20 - 32 mmol/L Final     Glucose   Date Value Ref Range Status   09/19/2023 113 (H) 65 - 99 mg/dL Final     Comment:                   Fasting reference interval     For someone without known diabetes, a glucose value  between 100 and 125 mg/dL is consistent with  prediabetes and should be confirmed with a  follow-up test.          BUN   Date Value Ref Range Status   09/19/2023 21 7 - 25 mg/dL Final     Creatinine   Date Value Ref Range Status   09/19/2023 " 1.29 (H) 0.50 - 1.03 mg/dL Final     Calcium   Date Value Ref Range Status   09/19/2023 10.3 8.6 - 10.4 mg/dL Final     Total Protein   Date Value Ref Range Status   09/19/2023 7.7 6.1 - 8.1 g/dL Final     Albumin   Date Value Ref Range Status   09/19/2023 4.4 3.6 - 5.1 g/dL Final     Total Bilirubin   Date Value Ref Range Status   09/19/2023 0.3 0.2 - 1.2 mg/dL Final     Alkaline Phosphatase   Date Value Ref Range Status   01/08/2010 105 55 - 135 U/L Final     AST   Date Value Ref Range Status   09/19/2023 21 10 - 35 U/L Final     ALT   Date Value Ref Range Status   09/19/2023 19 6 - 29 U/L Final     Anion Gap   Date Value Ref Range Status   01/08/2010 14 10 - 20 mmol/L Final     eGFR   Date Value Ref Range Status   09/19/2023 48 (L) > OR = 60 mL/min/1.73m2 Final           Assessment:   1. Gastroesophageal reflux disease, unspecified whether esophagitis present    2.      Personal history of colon polyps  3.     Mild constipation while on Moujaro, improved with fiber    Plan:  -Start Omeprazole 20mg daily.  -Schedule EGD and colonoscopy with extended prep due to her constipation.    Lidia Faria MD

## 2023-11-21 ENCOUNTER — TELEPHONE (OUTPATIENT)
Dept: ENDOSCOPY | Facility: HOSPITAL | Age: 58
End: 2023-11-21
Payer: COMMERCIAL

## 2023-11-21 NOTE — TELEPHONE ENCOUNTER
"----- Message from Ladi iDaz sent at 2023  9:31 AM CST -----  Regarding: FW: EGD and colonoscopy    ----- Message -----  From: Lidia Faria MD  Sent: 2023   3:00 PM CST  To: [facility]  Subject: EGD and colonoscopy                              Procedure: EGD/Colonoscopy    Diagnosis: Surveillance colonoscopy - Hx of colon polyps and GERD    Procedure Timin-12 weeks    #If within 4 weeks selected, please mayda as high priority#    #If greater than 12 weeks, please select "5-12 weeks" and delay sending until 3 months prior to requested date#     Provider: Myself    Location:  Endo    Additional Scheduling Information: No scheduling concerns    Prep Specifications:Extended/Constipation prep    Is the patient taking a GLP-1 Agonist:yes    Have you attached a patient to this message: yes        "

## 2023-11-22 ENCOUNTER — TELEPHONE (OUTPATIENT)
Dept: ENDOSCOPY | Facility: HOSPITAL | Age: 58
End: 2023-11-22
Payer: COMMERCIAL

## 2023-11-22 NOTE — TELEPHONE ENCOUNTER
"----- Message from Ladi Diaz sent at 2023  9:31 AM CST -----  Regarding: FW: EGD and colonoscopy    ----- Message -----  From: Lidia Faria MD  Sent: 2023   3:00 PM CST  To: [facility]  Subject: EGD and colonoscopy                              Procedure: EGD/Colonoscopy    Diagnosis: Surveillance colonoscopy - Hx of colon polyps and GERD    Procedure Timin-12 weeks    #If within 4 weeks selected, please mayda as high priority#    #If greater than 12 weeks, please select "5-12 weeks" and delay sending until 3 months prior to requested date#     Provider: Myself    Location:  Endo    Additional Scheduling Information: No scheduling concerns    Prep Specifications:Extended/Constipation prep    Is the patient taking a GLP-1 Agonist:yes    Have you attached a patient to this message: yes        "

## 2023-11-27 ENCOUNTER — TELEPHONE (OUTPATIENT)
Dept: ENDOSCOPY | Facility: HOSPITAL | Age: 58
End: 2023-11-27
Payer: COMMERCIAL

## 2023-11-27 DIAGNOSIS — Z12.11 SCREENING FOR COLON CANCER: Primary | ICD-10-CM

## 2023-11-27 DIAGNOSIS — Z86.010 HX OF COLONIC POLYPS: Primary | ICD-10-CM

## 2023-11-27 DIAGNOSIS — K21.9 GASTROESOPHAGEAL REFLUX DISEASE WITHOUT ESOPHAGITIS: ICD-10-CM

## 2023-11-27 NOTE — TELEPHONE ENCOUNTER
"----- Message from Ladi Diaz sent at 2023  9:31 AM CST -----  Regarding: FW: EGD and colonoscopy    ----- Message -----  From: Lidia Faria MD  Sent: 2023   3:00 PM CST  To: [facility]  Subject: EGD and colonoscopy                              Procedure: EGD/Colonoscopy    Diagnosis: Surveillance colonoscopy - Hx of colon polyps and GERD    Procedure Timin-12 weeks    #If within 4 weeks selected, please mayda as high priority#    #If greater than 12 weeks, please select "5-12 weeks" and delay sending until 3 months prior to requested date#     Provider: Myself    Location:  Endo    Additional Scheduling Information: No scheduling concerns    Prep Specifications:Extended/Constipation prep    Is the patient taking a GLP-1 Agonist:yes    Have you attached a patient to this message: yes        "

## 2023-11-27 NOTE — TELEPHONE ENCOUNTER
Spoke to pt to schedule procedure(s) Colonoscopy/EGD       Physician to perform procedure(s) Dr. MARIA R Faria   Date of Procedure (s) 03/26/24  Arrival Time 11:30 AM  Time of Procedure(s) 12:30 PM   Location of Procedure(s) [address]  Type of Rx Prep sent to patient: PEG/extended  Instructions provided to patient via [facility]    Patient was informed on the following information and verbalized understanding. Screening questionnaire reviewed with patient and complete. If procedure requires anesthesia, a responsible adult needs to be present to accompany the patient home, patient cannot drive after receiving anesthesia. Appointment details are tentative, especially check-in time. Patient will receive a prep-op call 7 days prior to confirm check-in time for procedure. If applicable the patient should contact their pharmacy to verify Rx for procedure prep is ready for pick-up. Patient was advised to call the scheduling department at [phone] if pharmacy states no Rx is available. Patient was advised to call the endoscopy scheduling department if any questions or concerns arise.      [facility] Scheduling Department

## 2023-12-12 LAB
PAP RECOMMENDATION EXT: NORMAL
PAP SMEAR: NORMAL

## 2023-12-15 DIAGNOSIS — E11.42 TYPE 2 DIABETES MELLITUS WITH DIABETIC POLYNEUROPATHY, WITHOUT LONG-TERM CURRENT USE OF INSULIN: ICD-10-CM

## 2023-12-15 RX ORDER — METFORMIN HYDROCHLORIDE 500 MG/1
TABLET, EXTENDED RELEASE ORAL
Qty: 270 TABLET | Refills: 3 | Status: SHIPPED | OUTPATIENT
Start: 2023-12-15

## 2023-12-15 NOTE — TELEPHONE ENCOUNTER
Refill Routing Note   Medication(s) are not appropriate for processing by [facility] for the following reason(s):        No active prescription written by provider    [facility] action(s):  Defer        Medication Therapy Plan:  AS OF 2023      Appointments  past 12m or future 3m with PCP    Date Provider   Last Visit   2023 Britney Byrd, DO   Next Visit   2024 Britney Byrd, DO   ED visits in past 90 days: 0        Note composed:1:32 PM 12/15/2023

## 2023-12-15 NOTE — TELEPHONE ENCOUNTER
No care due was identified.  [facility] Embedded Care Due Messages. Reference number: [id number].   12/15/2023 11:23:01 AM CST

## 2024-01-04 ENCOUNTER — PATIENT MESSAGE (OUTPATIENT)
Dept: FAMILY MEDICINE | Facility: CLINIC | Age: 59
End: 2024-01-04
Payer: COMMERCIAL

## 2024-01-04 DIAGNOSIS — E11.42 TYPE 2 DIABETES MELLITUS WITH DIABETIC POLYNEUROPATHY, WITHOUT LONG-TERM CURRENT USE OF INSULIN: Primary | ICD-10-CM

## 2024-01-05 RX ORDER — TIRZEPATIDE 5 MG/.5ML
5 INJECTION, SOLUTION SUBCUTANEOUS
Qty: 4 PEN | Refills: 11 | Status: SHIPPED | OUTPATIENT
Start: 2024-01-05 | End: 2025-01-04

## 2024-01-11 ENCOUNTER — TELEPHONE (OUTPATIENT)
Dept: PHARMACY | Facility: CLINIC | Age: 59
End: 2024-01-11
Payer: COMMERCIAL

## 2024-03-12 ENCOUNTER — TELEPHONE (OUTPATIENT)
Dept: ENDOSCOPY | Facility: HOSPITAL | Age: 59
End: 2024-03-12
Payer: COMMERCIAL

## 2024-03-12 NOTE — TELEPHONE ENCOUNTER
Telephoned pt to review GLP-1 instructions for upcoming colonoscopy on 3/26/24 with no answer.   Left voicemail with direct contact number for pt to return call.  Portal message also sent.

## 2024-03-13 ENCOUNTER — TELEPHONE (OUTPATIENT)
Dept: ENDOSCOPY | Facility: HOSPITAL | Age: 59
End: 2024-03-13
Payer: COMMERCIAL

## 2024-03-13 DIAGNOSIS — E11.9 TYPE 2 DIABETES MELLITUS WITHOUT COMPLICATION, UNSPECIFIED WHETHER LONG TERM INSULIN USE: ICD-10-CM

## 2024-03-13 NOTE — TELEPHONE ENCOUNTER
Received patient's call to cancel EGD/Colonoscopy at this time. Patient states she wants to wait until December when her deductible has been met. Instructed patient to please call endo scheduling when she is ready to schedule. Patient removed from 3/26/24 schedule. Patient verbalized an understanding.

## 2024-03-13 NOTE — TELEPHONE ENCOUNTER
----- Message from Kimberly Sánchez LPN sent at 3/13/2024  3:14 PM CDT -----  Regarding: FW: pt called    ----- Message -----  From: Fide Sims  Sent: 3/13/2024   3:01 PM CDT  To: [facility] Staff  Subject: pt called                                        Name of Who is Calling: FESTUS SALCIDO [[id number]]      What is the request in detail: pt would like to speak with the office to clarify when she needs to stop her mounjaro medication. Please advise       Can the clinic reply by [facility]: No       What Number to Call Back if not in [facility]: Telephone Information:  Mobile          [phone]

## 2024-03-13 NOTE — TELEPHONE ENCOUNTER
spoke with patient this afternoon and was removed from schedule. She is looking to scheduling in December after deductible has been met.

## 2024-03-26 ENCOUNTER — PATIENT OUTREACH (OUTPATIENT)
Dept: ADMINISTRATIVE | Facility: HOSPITAL | Age: 59
End: 2024-03-26
Payer: COMMERCIAL

## 2024-03-26 DIAGNOSIS — E11.9 DIABETES MELLITUS WITHOUT COMPLICATION: Primary | ICD-10-CM

## 2024-03-26 RX ORDER — CICLOPIROX OLAMINE 7.7 MG/G
CREAM TOPICAL 2 TIMES DAILY
COMMUNITY
Start: 2023-12-12

## 2024-03-26 RX ORDER — PROMETHAZINE HYDROCHLORIDE AND DEXTROMETHORPHAN HYDROBROMIDE 6.25; 15 MG/5ML; MG/5ML
SYRUP ORAL
COMMUNITY
Start: 2023-11-20 | End: 2024-04-05 | Stop reason: SDUPTHER

## 2024-03-26 RX ORDER — TERCONAZOLE 8 MG/G
1 CREAM VAGINAL
COMMUNITY
Start: 2024-02-26

## 2024-03-26 RX ORDER — TIRZEPATIDE 2.5 MG/.5ML
2.5 INJECTION, SOLUTION SUBCUTANEOUS
COMMUNITY
Start: 2023-04-21 | End: 2024-04-05 | Stop reason: DRUGHIGH

## 2024-03-26 RX ORDER — ATORVASTATIN CALCIUM 20 MG/1
TABLET, FILM COATED ORAL
COMMUNITY
End: 2024-04-05 | Stop reason: SDUPTHER

## 2024-03-26 RX ORDER — ESTRADIOL 0.1 MG/G
0.5 CREAM VAGINAL
COMMUNITY
Start: 2023-12-12

## 2024-03-26 RX ORDER — ACETAMINOPHEN 160 MG/5ML
1 SUSPENSION, ORAL (FINAL DOSE FORM) ORAL 2 TIMES DAILY
COMMUNITY

## 2024-03-26 RX ORDER — FLUCONAZOLE 150 MG/1
1 TABLET ORAL EVERY OTHER DAY
COMMUNITY
Start: 2023-12-12

## 2024-03-26 NOTE — PROGRESS NOTES
March 2024 Comm. Gap Report [facility] 1 03.18.24 Diabetic Eye Exam - the patient is scheduled for an eye appointment in 3 weeks @ [facility].    Overdue Diabetes Labs - orders were faxed to [facility], per the patient's request.    Pap Smear updated.

## 2024-04-05 ENCOUNTER — OFFICE VISIT (OUTPATIENT)
Dept: FAMILY MEDICINE | Facility: CLINIC | Age: 59
End: 2024-04-05
Payer: COMMERCIAL

## 2024-04-05 VITALS
TEMPERATURE: 98 F | WEIGHT: 175.38 LBS | OXYGEN SATURATION: 97 % | HEIGHT: 63 IN | DIASTOLIC BLOOD PRESSURE: 82 MMHG | BODY MASS INDEX: 31.07 KG/M2 | SYSTOLIC BLOOD PRESSURE: 130 MMHG | HEART RATE: 86 BPM

## 2024-04-05 DIAGNOSIS — E66.09 CLASS 1 OBESITY DUE TO EXCESS CALORIES WITH SERIOUS COMORBIDITY AND BODY MASS INDEX (BMI) OF 31.0 TO 31.9 IN ADULT: ICD-10-CM

## 2024-04-05 DIAGNOSIS — I10 ESSENTIAL HYPERTENSION: ICD-10-CM

## 2024-04-05 DIAGNOSIS — E03.9 HYPOTHYROIDISM, UNSPECIFIED TYPE: ICD-10-CM

## 2024-04-05 DIAGNOSIS — E11.69 DYSLIPIDEMIA ASSOCIATED WITH TYPE 2 DIABETES MELLITUS: ICD-10-CM

## 2024-04-05 DIAGNOSIS — J06.9 UPPER RESPIRATORY TRACT INFECTION, UNSPECIFIED TYPE: ICD-10-CM

## 2024-04-05 DIAGNOSIS — E78.5 DYSLIPIDEMIA ASSOCIATED WITH TYPE 2 DIABETES MELLITUS: ICD-10-CM

## 2024-04-05 DIAGNOSIS — E11.42 TYPE 2 DIABETES MELLITUS WITH DIABETIC POLYNEUROPATHY, WITHOUT LONG-TERM CURRENT USE OF INSULIN: Primary | ICD-10-CM

## 2024-04-05 PROCEDURE — 4010F ACE/ARB THERAPY RXD/TAKEN: CPT | Mod: CPTII,S$GLB,, | Performed by: FAMILY MEDICINE

## 2024-04-05 PROCEDURE — 3008F BODY MASS INDEX DOCD: CPT | Mod: CPTII,S$GLB,, | Performed by: FAMILY MEDICINE

## 2024-04-05 PROCEDURE — 1160F RVW MEDS BY RX/DR IN RCRD: CPT | Mod: CPTII,S$GLB,, | Performed by: FAMILY MEDICINE

## 2024-04-05 PROCEDURE — 99999 PR PBB SHADOW E&M-EST. PATIENT-LVL V: CPT | Mod: PBBFAC,,, | Performed by: FAMILY MEDICINE

## 2024-04-05 PROCEDURE — 99214 OFFICE O/P EST MOD 30 MIN: CPT | Mod: S$GLB,,, | Performed by: FAMILY MEDICINE

## 2024-04-05 PROCEDURE — 1159F MED LIST DOCD IN RCRD: CPT | Mod: CPTII,S$GLB,, | Performed by: FAMILY MEDICINE

## 2024-04-05 PROCEDURE — 3044F HG A1C LEVEL LT 7.0%: CPT | Mod: CPTII,S$GLB,, | Performed by: FAMILY MEDICINE

## 2024-04-05 PROCEDURE — 3079F DIAST BP 80-89 MM HG: CPT | Mod: CPTII,S$GLB,, | Performed by: FAMILY MEDICINE

## 2024-04-05 PROCEDURE — 3075F SYST BP GE 130 - 139MM HG: CPT | Mod: CPTII,S$GLB,, | Performed by: FAMILY MEDICINE

## 2024-04-05 RX ORDER — LEVOTHYROXINE SODIUM 137 UG/1
137 TABLET ORAL
Qty: 90 TABLET | Refills: 3 | Status: CANCELLED | OUTPATIENT
Start: 2024-04-05 | End: 2025-04-05

## 2024-04-05 RX ORDER — ATORVASTATIN CALCIUM 10 MG/1
10 TABLET, FILM COATED ORAL DAILY
Qty: 90 TABLET | Refills: 3 | Status: SHIPPED | OUTPATIENT
Start: 2024-04-05 | End: 2025-04-05

## 2024-04-05 RX ORDER — LEVOTHYROXINE SODIUM 137 UG/1
137 TABLET ORAL
Qty: 30 TABLET | Refills: 11 | Status: SHIPPED | OUTPATIENT
Start: 2024-04-05 | End: 2025-04-05

## 2024-04-05 RX ORDER — PROMETHAZINE HYDROCHLORIDE AND DEXTROMETHORPHAN HYDROBROMIDE 6.25; 15 MG/5ML; MG/5ML
5 SYRUP ORAL EVERY 8 HOURS PRN
Qty: 118 ML | Refills: 1 | Status: SHIPPED | OUTPATIENT
Start: 2024-04-05

## 2024-04-05 RX ORDER — METFORMIN HYDROCHLORIDE 500 MG/1
TABLET, EXTENDED RELEASE ORAL
Qty: 270 TABLET | Refills: 3 | Status: SHIPPED | OUTPATIENT
Start: 2024-04-05

## 2024-04-05 NOTE — PROGRESS NOTES
Assessment & Plan:    Type 2 diabetes mellitus with diabetic polyneuropathy, without long-term current use of insulin  -     metFORMIN (GLUCOPHAGE-XR) 500 MG ER 24hr tablet; TAKE 2 TABLETS BY MOUTH EVERY MORNING AND 1 TABLET BEFORE BEDTIME  Dispense: 270 tablet; Refill: 3  -     blood sugar diagnostic Strp; Place 1 strip onto the skin 3 (three) times daily.  Dispense: 90 each; Refill: 11  -     CBC Auto Differential; Future; Expected date: 04/05/2024  -     Comprehensive Metabolic Panel; Future; Expected date: 04/05/2024  -     Hemoglobin A1C; Future; Expected date: 04/05/2024  -     Lipid Panel; Future; Expected date: 04/05/2024  -     Microalbumin/Creatinine Ratio, Urine; Future; Expected date: 04/05/2024    Controlled. Medication & strips refilled.   Foot exam performed.   Patient to schedule eye exam.  Repeat labs in 6 mo.    Class 1 obesity due to excess calories with serious comorbidity and body mass index (BMI) of 31.0 to 31.9 in adult  Losing weight effectively with Mounjaro.    Dyslipidemia associated with type 2 diabetes mellitus  -     atorvastatin (LIPITOR) 10 MG tablet; Take 1 tablet (10 mg total) by mouth once daily.  Dispense: 90 tablet; Refill: 3  -     Lipid Panel; Future; Expected date: 04/05/2024    Significantly improved with Lipitor 10 mg.     Hypothyroidism, unspecified type  -     levothyroxine (SYNTHROID) 137 MCG Tab tablet; Take 1 tablet (137 mcg total) by mouth before breakfast.  Dispense: 30 tablet; Refill: 11  -     TSH; Future; Expected date: 04/05/2024  -     T4, Free; Future; Expected date: 04/05/2024  -     T3; Future; Expected date: 04/05/2024    Generic sent to pharmacy.    Essential hypertension  -     CBC Auto Differential; Future; Expected date: 04/05/2024  -     Comprehensive Metabolic Panel; Future; Expected date: 04/05/2024  -     Hemoglobin A1C; Future; Expected date: 04/05/2024  -     Lipid Panel; Future; Expected date: 04/05/2024    Controlled. Continue current therapy.      Upper respiratory tract infection, unspecified type  -     promethazine-dextromethorphan (PROMETHAZINE-DM) 6.25-15 mg/5 mL Syrp; Take 5 mLs by mouth every 8 (eight) hours as needed (cough).  Dispense: 118 mL; Refill: 1    Cough syrup prn.    Health maintenance reviewed & addressed above.    Follow-up: Follow up in about 6 months (around 10/5/2024).  ______________________________________________________________________    Chief Complaint  Chief Complaint   Patient presents with    Diabetes       HPI  Shonda Quintanilla is a 59 y.o. female with medical diagnoses as listed in the medical history and problem list that presents to the office to follow up on her chronic conditions. She is recovering from a URI and requesting refill of her cough syrup. Requesting generic form of Synthroid because the brand is too expensive. She has started taking Lipitor 10 mg with CoQ-10 and tolerating this well.     Most recent pertinent workup:     Last Lipids  Lab Results   Component Value Date    CHOL 140 2024    TRIG 120 2024    HDL 39 (L) 2024    LDLCALC 77 2024       Last A1C  Lab Results   Component Value Date    HGBA1C 5.6 2024         Health Maintenance         Date Due Completion Date    Shingles Vaccine (1 of 2) Never done ---    Eye Exam 2023 3/17/2022    COVID-19 Vaccine ( season) 2023    Diabetes Urine Screening 2024    Hemoglobin A1c 10/02/2024 2024    Mammogram 2024    Lipid Panel 2025    Foot Exam 2025    Colorectal Cancer Screening 2026    Cervical Cancer Screening 2026    TETANUS VACCINE 2032              PAST MEDICAL HISTORY:  Past Medical History:   Diagnosis Date    Hypertension     Hypothyroid        PAST SURGICAL HISTORY:  Past Surgical History:   Procedure Laterality Date     SECTION, CLASSIC      TUBAL LIGATION      URETHRAL  SLING         SOCIAL HISTORY:  Social History     Socioeconomic History    Marital status:     Number of children: 1   Tobacco Use    Smoking status: Never     Passive exposure: Never    Smokeless tobacco: Never   Substance and Sexual Activity    Alcohol use: Yes     Alcohol/week: 2.0 standard drinks of alcohol     Types: 1 Glasses of wine, 1 Shots of liquor per week     Comment: rare    Drug use: Never    Sexual activity: Yes     Partners: Male     Birth control/protection: None       FAMILY HISTORY:  Family History   Problem Relation Age of Onset    Kidney disease Mother     Hypertension Mother     Diabetes Mother     Stroke Father     Heart disease Father     Drug abuse Father     Asthma Father     Alcohol abuse Father     Drug abuse Sister     Depression Sister     Birth defects Sister     Mental illness Sister     Hypertension Sister     Drug abuse Sister     Diabetes Sister     Depression Sister     Drug abuse Brother     Drug abuse Brother     Asthma Daughter        ALLERGIES AND MEDICATIONS: updated and reviewed.  Review of patient's allergies indicates:  No Known Allergies  Current Outpatient Medications   Medication Sig Dispense Refill    amlodipine-valsartan (EXFORGE)  mg per tablet Take 1 tablet by mouth once daily. 90 tablet 3    baclofen (LIORESAL) 10 MG tablet TAKE ONE TABLET BY MOUTH TWICE DAILY 60 tablet 5    blood-glucose meter Misc To check BG once daily. 1 each 0    calcium carbonate-vitamin D3 600 mg-10 mcg (400 unit) Cap Take 1 tablet by mouth once daily.      carisoprodoL (SOMA) 350 MG tablet Take 175 mg by mouth nightly as needed.      ciclopirox (LOPROX) 0.77 % Crea Apply topically.      ciclopirox (LOPROX) 0.77 % Crea Apply topically 2 (two) times daily.      coenzyme Q10 200 mg capsule Take 1 capsule by mouth 2 (two) times daily.      estradioL (ESTRACE) 0.01 % (0.1 mg/gram) vaginal cream Place 0.5 g vaginally.      fluconazole (DIFLUCAN) 150 MG Tab Take 1 tablet by mouth  every other day.      ibuprofen (ADVIL,MOTRIN) 800 MG tablet TAKE ONE TABLET BY MOUTH TWO OR THREE TIMES DAILY      icosapent ethyL (VASCEPA) 1 gram Cap Take 2 capsules by mouth 2 (two) times daily.      lancets 33 gauge Misc To check BG once daily. 200 each 11    meloxicam (MOBIC) 15 MG tablet Take 1 tablet (15 mg total) by mouth once daily. 30 tablet 5    omega-3 fatty acids 1,000 mg Cap Take 2 capsules by mouth once daily.      omeprazole (PRILOSEC) 20 MG capsule Take 1 capsule (20 mg total) by mouth once daily. 30 capsule 11    polyethylene glycol (COLYTE) 240-22.72-6.72 -5.84 gram SolR Patient with history of Constipation/Poor Prep. Please dispense 2 kits as directed. 8000 mL 0    terconazole (TERAZOL 3) 0.8 % vaginal cream Place 1 applicator vaginally.      tirzepatide (MOUNJARO) 5 mg/0.5 mL PnIj Inject 5 mg into the skin every 7 days. 4 pen 11    triamcinolone (KENALOG) 0.5 % ointment Apply topically.      atorvastatin (LIPITOR) 10 MG tablet Take 1 tablet (10 mg total) by mouth once daily. 90 tablet 3    blood sugar diagnostic Strp Place 1 strip onto the skin 3 (three) times daily. 90 each 11    levothyroxine (SYNTHROID) 137 MCG Tab tablet Take 1 tablet (137 mcg total) by mouth before breakfast. 30 tablet 11    metFORMIN (GLUCOPHAGE-XR) 500 MG ER 24hr tablet TAKE 2 TABLETS BY MOUTH EVERY MORNING AND 1 TABLET BEFORE BEDTIME 270 tablet 3    promethazine-dextromethorphan (PROMETHAZINE-DM) 6.25-15 mg/5 mL Syrp Take 5 mLs by mouth every 8 (eight) hours as needed (cough). 118 mL 1     No current facility-administered medications for this visit.         ROS  Review of Systems   Constitutional:  Negative for activity change and fever.   Respiratory:  Positive for cough.    Musculoskeletal:  Positive for arthralgias (chronic).           Physical Exam  Vitals:    04/05/24 1418   BP: 130/82   BP Location: Right arm   Patient Position: Sitting   BP Method: Medium (Manual)   Pulse: 86   Temp: 97.7 °F (36.5 °C)   TempSrc:  "Oral   SpO2: 97%   Weight: 79.5 kg (175 lb 6 oz)   Height: 5' 3" (1.6 m)    Body mass index is 31.07 kg/m².  Weight: 79.5 kg (175 lb 6 oz)   Height: 5' 3" (160 cm)   Physical Exam  Constitutional:       General: She is not in acute distress.     Appearance: She is obese.   HENT:      Head: Normocephalic and atraumatic.   Neck:      Thyroid: No thyromegaly.      Vascular: No carotid bruit.   Cardiovascular:      Rate and Rhythm: Normal rate and regular rhythm.      Pulses: Normal pulses.      Heart sounds: Normal heart sounds.   Pulmonary:      Effort: Pulmonary effort is normal. No respiratory distress.      Breath sounds: Normal breath sounds.   Musculoskeletal:      Cervical back: Neck supple.      Right lower leg: No edema.      Left lower leg: No edema.   Feet:      Comments: Protective Sensation (w/ 10 gram monofilament):  Right: Intact  Left: Intact    Visual Inspection:  Normal -  Bilateral    Pedal Pulses:   Right: Present  Left: Present    Posterior Tibialis Pulses:   Right:Present  Left: Present      Lymphadenopathy:      Cervical: No cervical adenopathy.   Skin:     General: Skin is warm and dry.      Findings: No rash.   Neurological:      General: No focal deficit present.      Mental Status: She is alert and oriented to person, place, and time.   Psychiatric:         Mood and Affect: Mood normal.         Behavior: Behavior normal.         Thought Content: Thought content normal.             "

## 2024-04-19 ENCOUNTER — PATIENT OUTREACH (OUTPATIENT)
Dept: ADMINISTRATIVE | Facility: HOSPITAL | Age: 59
End: 2024-04-19
Payer: COMMERCIAL

## 2024-04-19 RX ORDER — ATORVASTATIN CALCIUM 40 MG/1
40 TABLET, FILM COATED ORAL
COMMUNITY
Start: 2024-04-09

## 2024-04-19 RX ORDER — VALSARTAN 160 MG/1
1 TABLET ORAL 2 TIMES DAILY
COMMUNITY
Start: 2024-04-09

## 2024-04-19 NOTE — LETTER
AUTHORIZATION FOR RELEASE OF   CONFIDENTIAL INFORMATION    Dear Dr. Gallo Maya,    We are seeing Shonda Quintanilla, date of birth 1965, in the clinic at [facility]/ [facility]/ PEDS. Britney Byrd DO is the patient's PCP. Shonda Quintanilla has an outstanding lab/procedure at the time we reviewed her chart. In order to help keep her health information updated, she has authorized us to request the following medical record(s):        (  )  MAMMOGRAM                                      (  )  COLONOSCOPY      (  )  PAP SMEAR                                          (  )  OUTSIDE LAB RESULTS     (  )  DEXA SCAN                                          ( x ) EYE EXAM(diabetic) [id number]            (  )  FOOT EXAM                                          (  )  ENTIRE RECORD     (  )  OUTSIDE IMMUNIZATIONS                 (  )  _______________         Please fax records to [facility], Adams, Lyndsey R., DO,  [phone].     If you have any questions, please contact Breonna Castellanos LPN [facility] at   [phone].     Patient Name: Shonda Quintanilla  : 1965  Patient Phone #: [phone]

## 2024-04-23 NOTE — PROGRESS NOTES
Release of information returned from Dr. Maya office, no 2024 eye exam completed. Last one day 03/2022.

## 2024-05-02 ENCOUNTER — PATIENT MESSAGE (OUTPATIENT)
Dept: FAMILY MEDICINE | Facility: CLINIC | Age: 59
End: 2024-05-02
Payer: COMMERCIAL

## 2024-07-02 ENCOUNTER — PATIENT OUTREACH (OUTPATIENT)
Dept: ADMINISTRATIVE | Facility: HOSPITAL | Age: 59
End: 2024-07-02
Payer: COMMERCIAL

## 2024-07-02 RX ORDER — OMEPRAZOLE 20 MG/1
1 CAPSULE, DELAYED RELEASE ORAL DAILY
COMMUNITY

## 2024-07-02 NOTE — PROGRESS NOTES
[facility] 1 Comm Gap Report 06.28.24 Diabetic Eye Exam - Left message for patient to call our office. Please schedule.    [facility] 1 Comm Gap Report 06.28.24 Statin Therapy - Left reminder message for patient to refill her medication.

## 2024-07-31 ENCOUNTER — OFFICE VISIT (OUTPATIENT)
Dept: SPORTS MEDICINE | Facility: CLINIC | Age: 59
End: 2024-07-31
Payer: COMMERCIAL

## 2024-07-31 ENCOUNTER — HOSPITAL ENCOUNTER (OUTPATIENT)
Dept: RADIOLOGY | Facility: HOSPITAL | Age: 59
Discharge: HOME | End: 2024-07-31
Attending: PHYSICIAN ASSISTANT
Payer: COMMERCIAL

## 2024-07-31 VITALS
HEART RATE: 76 BPM | BODY MASS INDEX: 31.54 KG/M2 | DIASTOLIC BLOOD PRESSURE: 84 MMHG | SYSTOLIC BLOOD PRESSURE: 140 MMHG | WEIGHT: 178 LBS

## 2024-07-31 DIAGNOSIS — M25.562 LEFT KNEE PAIN, UNSPECIFIED CHRONICITY: ICD-10-CM

## 2024-07-31 DIAGNOSIS — M62.89 MUSCLE TIGHTNESS: ICD-10-CM

## 2024-07-31 DIAGNOSIS — S76.312A: ICD-10-CM

## 2024-07-31 DIAGNOSIS — M70.50 PES ANSERINE BURSITIS: Primary | ICD-10-CM

## 2024-07-31 PROCEDURE — 3077F SYST BP >= 140 MM HG: CPT | Mod: CPTII,S$GLB,, | Performed by: PHYSICIAN ASSISTANT

## 2024-07-31 PROCEDURE — 3079F DIAST BP 80-89 MM HG: CPT | Mod: CPTII,S$GLB,, | Performed by: PHYSICIAN ASSISTANT

## 2024-07-31 PROCEDURE — 1159F MED LIST DOCD IN RCRD: CPT | Mod: CPTII,S$GLB,, | Performed by: PHYSICIAN ASSISTANT

## 2024-07-31 PROCEDURE — 99203 OFFICE O/P NEW LOW 30 MIN: CPT | Mod: S$GLB,,, | Performed by: PHYSICIAN ASSISTANT

## 2024-07-31 PROCEDURE — 73564 X-RAY EXAM KNEE 4 OR MORE: CPT | Mod: TC,50

## 2024-07-31 PROCEDURE — 4010F ACE/ARB THERAPY RXD/TAKEN: CPT | Mod: CPTII,S$GLB,, | Performed by: PHYSICIAN ASSISTANT

## 2024-07-31 PROCEDURE — 3044F HG A1C LEVEL LT 7.0%: CPT | Mod: CPTII,S$GLB,, | Performed by: PHYSICIAN ASSISTANT

## 2024-07-31 PROCEDURE — 99999 PR PBB SHADOW E&M-EST. PATIENT-LVL III: CPT | Mod: PBBFAC,,, | Performed by: PHYSICIAN ASSISTANT

## 2024-07-31 PROCEDURE — 3008F BODY MASS INDEX DOCD: CPT | Mod: CPTII,S$GLB,, | Performed by: PHYSICIAN ASSISTANT

## 2024-07-31 PROCEDURE — 1160F RVW MEDS BY RX/DR IN RCRD: CPT | Mod: CPTII,S$GLB,, | Performed by: PHYSICIAN ASSISTANT

## 2024-07-31 PROCEDURE — 73564 X-RAY EXAM KNEE 4 OR MORE: CPT | Mod: 26,,, | Performed by: RADIOLOGY

## 2024-07-31 NOTE — PROGRESS NOTES
Subjective:     Chief Complaint: Shonda Quintanilla is a 59 y.o. female who had concerns including Pain of the Left Knee.    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 Pain is located over (points to) medial joint line and behind the patella knee. She reports that the pain is a 7 /10 sharp pain today. Associated symptoms include swelling. Previous treatments include CSI, which provided moderate in the past. Pain is not responding adequately to conservative measures which have included activity modifications, rest, and oral medication (baclofen, ibuprofen, tylenol). Is affecting ADLs and limiting desired level of activity. Denies numbness, tingling, radiation . Moderate pain to bear weight.  Pain is 7 /10 at its worst    Mechanical symptoms: none  Subjective instability: (+)   Worse with weightbearing and increased activity  Better with rest.   Nocturnal symptoms: (--)    No previous surgeries or recent trauma on knee                  Review of Systems   Constitutional: Negative for chills and fever.   HENT:  Negative for congestion and sore throat.    Eyes:  Negative for discharge and double vision.   Cardiovascular:  Negative for chest pain, palpitations and syncope.   Respiratory:  Negative for cough and shortness of breath.    Endocrine: Negative for cold intolerance and heat intolerance.   Skin:  Negative for dry skin and rash.   Musculoskeletal:  Positive for joint pain and joint swelling.   Gastrointestinal:  Negative for abdominal pain, nausea and vomiting.   Neurological:  Negative for focal weakness, numbness and paresthesias.       Pain Related Questions  Over the past 3 days, what was your average pain during activity? (I.e. running, jogging, walking, climbing stairs, getting dressed, ect.): 7  Over the past 3 days, what was your highest pain level?: 7  Over the past 3  days, what was your lowest pain level? : 5    Other  How many nights a week are you awakened by your affected body part?: 3  Was the patient's HEIGHT measured or patient reported?: Patient Reported  Was the patient's WEIGHT measured or patient reported?: Measured    Objective:     General: Shonda is well-developed, well-nourished, appears stated age, in no acute distress, alert and oriented to time, place and person.     General    Nursing note and vitals reviewed.  Constitutional: She is oriented to person, place, and time. She appears well-developed and well-nourished. No distress.   Eyes: Conjunctivae and EOM are normal. Pupils are equal, round, and reactive to light.   Neck: Neck supple. No JVD present.   Cardiovascular:  Normal heart sounds and intact distal pulses.            No murmur heard.  Pulmonary/Chest: Effort normal and breath sounds normal. No respiratory distress.   Abdominal: Soft. Bowel sounds are normal. She exhibits no distension. There is no abdominal tenderness.   Neurological: She is alert and oriented to person, place, and time. Coordination normal.   Psychiatric: She has a normal mood and affect. Her behavior is normal. Judgment and thought content normal.     General Musculoskeletal Exam   Gait: normal       Right Knee Exam     Inspection   Erythema: absent  Scars: absent  Swelling: absent  Effusion: absent  Deformity: absent  Bruising: absent    Range of Motion   Extension:  0   Flexion:  130     Tests   Meniscus   Angela:  Medial - negative Lateral - negative  Ligament Examination   Lachman: normal (-1 to 2mm)   PCL-Posterior Drawer: normal (0 to 2mm)     MCL - Valgus: normal (0 to 2mm)  LCL - Varus: normal  Dial Test at 90 degrees: normal (< 5 degrees)  Posterolateral Corner: stable  Patella   Patellar Glide (quadrants): Lateral - 1   Medial - 2  Patellar Grind: negative    Other   Popliteal (Baker's) Cyst: absent  Muscle Tightness: hamstring tightness  Sensation: normal    Left Knee  Exam     Inspection   Erythema: absent  Scars: absent  Swelling: absent  Effusion: absent  Deformity: absent  Bruising: absent    Tenderness   The patient tender to palpation of the pes anserinus.    Range of Motion   Extension:  0   Flexion:  130     Tests   Meniscus   Angela:  Medial - negative Lateral - negative  Stability   Lachman: normal (-1 to 2mm)   PCL-Posterior Drawer: normal (0 to 2mm)  MCL - Valgus: normal (0 to 2mm)  LCL - Varus: normal (0 to 2mm)  Dial Test at 90 degrees: normal (< 5 degrees)  Posterolateral Corner: stable  Patella   Patellar Glide (Quadrants): Lateral - 1 Medial - 2  Patellar Grind: negative    Other   Popliteal (Baker's) Cyst: absent  Muscle Tightness: hamstring tightness  Sensation: normal    Right Hip Exam     Tests   Jigna: negative  Left Hip Exam     Tests   Ijgna: negative          Muscle Strength   Right Lower Extremity   Hip Abduction: 5/5   Quadriceps:  5/5   Hamstrin/5   Left Lower Extremity   Hip Abduction: 5/5   Quadriceps:  5/5   Hamstrin/5     Vascular Exam     Right Pulses  Dorsalis Pedis:      2+  Posterior Tibial:      2+        Left Pulses  Dorsalis Pedis:      2+  Posterior Tibial:      2+        Edema  Right Lower Leg: absent  Left Lower Leg: absent    Radiographic findings today:    Evidence of disuse osteopenia. Mild moderate anterior proximal prominence, spur formation at anterior tibial tubercle, remote Osgood Stevenson change. Minimal tricompartment DJD changes particular left patellofemoral joint, minimal mild lateral subluxation patellofemoral joint.     Patella baja, bilaterally    Kellgren-Trell Classification: 2    Xrays of the knees were ordered and reviewed by me today. These findings were discussed and reviewed with the patient.      Assessment:     Encounter Diagnoses   Name Primary?    Pes anserine bursitis Yes    Strain of tendon of left hamstring muscle     Left knee pain, unspecified chronicity     Muscle tightness         Plan:      We have discussed a variety of treatment options including medications, injections, physical therapy and other alternative treatments. I also explained the indications, risks and benefits of surgery. Given the patients hx and examination today, I believe she would benefit from physical therapy. Pt agrees and would like to proceed with physical therapy.    I made the decision to obtain old records of the patient including previous notes and imaging. I independently reviewed and interpreted lab results today as well as prior imaging.      1. OTC Tylenol as needed. NSAIDs contraindicated.   2. Ambulatory referral to physical therapy for patellofemoral strengthening and conditioning.  3. Ice compress to the affected area 2-3x a day for 15-20 minutes as needed for pain management.  4. RTC to see Elijah Anderson PA-C in 6 weeks for follow-up.      All of the patient's questions were answered and the patient will contact us if they have any questions or concerns in the interim.      Patient questionnaires may have been collected.

## 2024-08-16 ENCOUNTER — CLINICAL SUPPORT (OUTPATIENT)
Dept: REHABILITATION | Facility: HOSPITAL | Age: 59
End: 2024-08-16
Payer: COMMERCIAL

## 2024-08-16 DIAGNOSIS — M25.562 LEFT KNEE PAIN, UNSPECIFIED CHRONICITY: ICD-10-CM

## 2024-08-16 DIAGNOSIS — M70.50 PES ANSERINE BURSITIS: ICD-10-CM

## 2024-08-16 PROCEDURE — 97110 THERAPEUTIC EXERCISES: CPT

## 2024-08-16 PROCEDURE — 97161 PT EVAL LOW COMPLEX 20 MIN: CPT

## 2024-08-16 NOTE — PLAN OF CARE
[facility]   Physical Therapy Initial Evaluation      Name: Shonda Quintanilla  Clinic Number: [id number]    Therapy Diagnosis: No diagnosis found.     Physician: Josse Anderson, *    Physician Orders: PT Eval and Treat   Medical Diagnosis from Referral: Left knee pain, unspecified chronicity / Pes anserine bursitis  Evaluation Date: 8/16/2024  Authorization Period Expiration: 12/31/24  Plan of Care Expiration: 11/16/24  Progress Note Due: 9/16/24  Visit # / Visits authorized: 1 / 1    FOTO: 79%  FOTO 2:   FOTO 3:  DC FOTO @: 80%      Precautions: Standard     Time In: 100 pm   Time Out: 200 pm   Total Appointment Time (timed & untimed codes): 60 minutes    Subjective     Date of onset: 2 WEEKS AGO    History of current condition - Asmita reports: L knee pain 2 weeks ago walking at work and twisted her knee. She felt something and tried to stretch it out. 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 Couldn't put weight through it. Limped around the whole day couldn't put pressure through her knee. Took baclofen. It's been getting better. Went to [facility] who says it was hamstring attachment at pes anserine. Denies buckling, clicking, catching. Pain and soreness in medial knee; and superior lateral. Pain has improved to almost nothing at this point.    3 years ago she had a zipline injury on same knee (valgus injury). Got a cortisone for that but was severe. Took a while to heal.     She also states she feels tightness in hamstring and has cramping in calf and hamstring nightly but this was even before recent injury. She always feels like her right hamstring and quad is cramping and tight. She says her muscles do that and even went to a massage that worked on TMJ because her muscles were so tight that her teeth weren't fitting together recently.    Falls: none    Imaging:  XRAY (L KNEE): Evidence of disuse osteopenia. Mild moderate anterior proximal  prominence, spur formation at anterior tibial tubercle, remote Osgood Stevenson change. Minimal tricompartment DJD changes particular left patellofemoral joint, minimal mild lateral subluxation patellofemoral joint.     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 [place]   Prior Level of Function: no prior knee pain a part from 3 years ago   Current Level of Function: L knee pain     Pain:  Current 0/10, worst 1/10, best 0/10   Location: medial L knee and superior lateral pain  Description: tender/soreness  Aggravating Factors: end of day, getting up after a long car ride   Easing Factors: heat, advil, tylenol     Patients goals: pain-free     Medical History:   Past Medical History:   Diagnosis Date    Hypertension     Hypothyroid        Surgical History:   Shonda Quintanilla  has a past surgical history that includes  section, classic; Tubal ligation; and Urethral sling.    Medications:   Shonda has a current medication list which includes the following prescription(s): amlodipine-valsartan, atorvastatin, atorvastatin, baclofen, blood sugar diagnostic, blood-glucose meter, calcium carbonate-vitamin d3, carisoprodol, ciclopirox, ciclopirox, coenzyme q10, estradiol, fluconazole, ibuprofen, icosapent ethyl, lancets, levothyroxine, meloxicam, metformin, omega-3 fatty acids, omeprazole, omeprazole, polyethylene glycol, promethazine-dextromethorphan, terconazole, mounjaro, triamcinolone, and valsartan.    Allergies:   Review of patient's allergies indicates:  No Known Allergies     Objective      Observation: 59 year old female      Range of Motion (Active):   Knee Right  Left    Flexion 145 145   Extension 6 hyper  6 hyper       Special Tests:   Left   Valgus Stress Test -   Varus Stress test -   Lachman's test -   Posterior Lachman -   Coleman's Test + click, no pain   Thessaly's Test nt   Patellar Grind Test -   Medial/Lateral hamstring  MMT: cramping with lateral   (+) SLR   (+) Slump test    Joint Mobility: patellar mobility = wfl     Palpation: pes anserine, L lateral hamstring muscle belly, L quad     Sensation: light touch in tact    Flexibility:    Hamstrings: R = 175 ; L = 175       Limitation/Restriction for FOTO KNEE Survey    Therapist reviewed FOTO scores for Shonda Quintanilla on 8/16/2024.   FOTO documents entered into [employer] - see Media section.    Limitation Score: 79%         Treatment     Total Treatment time (time-based codes) separate from Evaluation: 10 minutes     Asmita received the treatments listed below:      Sciatic nerve glides 30x    Patient Education and Home Exercises     Education provided:   - HEP    Written Home Exercises Provided: yes. Exercises were reviewed and Asmita was able to demonstrate them prior to the end of the session.  Asmita demonstrated good  understanding of the education provided. See EMR under Patient Instructions for exercises provided during therapy sessions.    Assessment     Shonda is a 59 y.o. female referred to outpatient Physical Therapy with a medical diagnosis of Left knee pain, unspecified chronicity/Pes anserine bursitis. Patient presents with vague left medial knee pain. Difficult to reproduce symptoms except with palpation of pes anserine. She did have cramping in hamstrings, but length is not concern. She also presented with sciatic neurotension c/o cramping at night (not since injury).      Patient prognosis is Good.   Patient will benefit from skilled outpatient Physical Therapy to address the deficits stated above and in the chart below, provide patient /family education, and to maximize patientt's level of independence.     Plan of care discussed with patient: Yes  Patient's spiritual, cultural and educational needs considered and patient is agreeable to the plan of care and goals as stated below:     Anticipated Barriers for therapy: NONE    Medical Necessity is demonstrated by the  following  History  Co-morbidities and personal factors that may impact the plan of care [x] LOW: no personal factors / co-morbidities  [] MODERATE: 1-2 personal factors / co-morbidities  [] HIGH: 3+ personal factors / co-morbidities    Moderate / High Support Documentation:   Co-morbidities affecting plan of care: see chart    Personal Factors:   age     Examination  Body Structures and Functions, activity limitations and participation restrictions that may impact the plan of care [x] LOW: addressing 1-2 elements  [] MODERATE: 3+ elements  [] HIGH: 4+ elements (please support below)    Moderate / High Support Documentation:      Clinical Presentation [x] LOW: stable  [] MODERATE: Evolving  [] HIGH: Unstable     Decision Making/ Complexity Score: low       Goals:  Short Term Goals:  4 weeks  1.Report decreased R knee pain  < / =  8/10  to increase tolerance for ADLs  2. Increase knee ROM to R knee in order to be able to perform ADLs without difficulty.  3. Increase strength by 1/3 MMT grade in R knee  to increase tolerance for ADL and work activities.  4. Pt to tolerate HEP to improve ROM and independence with ADL's    Long Term Goals: 8 weeks  1.Report decreased R knee pain < / = 8/10  to increase tolerance for ADLs  2.Patient goal: pain-free  3.Increase strength to >/= 4+/5 in RLE  to increase tolerance for ADL and work activities.  4. Pt will report at CJ level (20-40% impaired) on FOTO knee to demonstrate increase in LE function with every day tasks.      Plan     Plan of care Certification: 8/16/2024 to 11/16/24.    Outpatient Physical Therapy 1 times weekly for 6 weeks to include the following interventions: Gait Training, Manual Therapy, Moist Heat/ Ice, Neuromuscular Re-ed, Patient Education, Self Care, Therapeutic Activities, Therapeutic Exercise,  dry needling.     Mary Alberts, PT

## 2024-08-23 ENCOUNTER — CLINICAL SUPPORT (OUTPATIENT)
Dept: REHABILITATION | Facility: HOSPITAL | Age: 59
End: 2024-08-23
Payer: COMMERCIAL

## 2024-08-23 DIAGNOSIS — M70.50 PES ANSERINE BURSITIS: Primary | ICD-10-CM

## 2024-08-23 PROCEDURE — 97112 NEUROMUSCULAR REEDUCATION: CPT

## 2024-08-23 PROCEDURE — 97110 THERAPEUTIC EXERCISES: CPT

## 2024-08-23 NOTE — PROGRESS NOTES
"[facility]   Physical Therapy Treatment Note      Name: Shonda Quintanilla  Clinic Number: [id number]    Therapy Diagnosis: No diagnosis found.  Physician: Josse Anderson, *    Visit Date: 8/23/2024    Physician Orders: PT Eval and Treat   Medical Diagnosis from Referral: Left knee pain, unspecified chronicity / Pes anserine bursitis  Evaluation Date: 8/16/2024  Authorization Period Expiration: 12/31/24  Plan of Care Expiration: 11/16/24  Progress Note Due: 9/16/24  Visit # / Visits authorized: 1 / 20     FOTO: 79%  FOTO 2:   FOTO 3:  DC FOTO @: 80%    PTA Visit #: 0/5     Time In: 1111  Time Out: 1200  Total Billable Time: 49 minutes    Subjective     Pt reports: no change in exercisies. Quad still cramping. Was massaging knee on way over here. Pain in knee and quad.  She was compliant with home exercise program.  Response to previous treatment: no change  Functional change: no change    Pain: 3/10  Location: L KNEE    Objective      Objective Measures updated at progress report unless specified.     Treatment     Asmita received the treatments listed below:      Asmita received therapeutic exercises to develop strength, endurance, ROM, flexibility, posture and core stabilization for 10 minutes including:     Sciatic nerve glides 2 x 30   B PQS w/ strap 3 x 30"    Asmita participated in neuromuscular re-education activities to improve: Balance, Coordination, Kinesthetic, Sense, Proprioception and Posture for 39 minutes. The following activities were included:  Quad sets 5" x 30  PPT 3 x 10  Side lying   Hip abd 3 x 10   Clam 3 x 10   Rev clam 3 x 10    Patient Education and Home Exercises       Education provided:   - continue with HEP  - cramping vs. Muscle working     Written Home Exercises Provided: Patient instructed to cont prior HEP. Exercises were reviewed and Asmita was able to demonstrate them prior to the end of the session.  Asmita demonstrated good  understanding of the education " provided. See EMR under Patient Instructions for exercises provided during therapy sessions    Assessment     Patient symptoms not necessarily consistent with a hamstring strain as she is having more quad tenderness and pain. Assess response to today's session    Asmita Is progressing well towards her goals.   Pt prognosis is Good.     Pt will continue to benefit from skilled outpatient physical therapy to address the deficits listed in the problem list box on initial evaluation, provide pt/family education and to maximize pt's level of independence in the home and community environment.     Pt's spiritual, cultural and educational needs considered and pt agreeable to plan of care and goals.     Anticipated barriers to physical therapy: none    Goals:   Short Term Goals:  4 weeks  1.Report decreased R knee pain  < / =  8/10  to increase tolerance for ADLs  2. Increase knee ROM to R knee in order to be able to perform ADLs without difficulty.  3. Increase strength by 1/3 MMT grade in R knee  to increase tolerance for ADL and work activities.  4. Pt to tolerate HEP to improve ROM and independence with ADL's     Long Term Goals: 8 weeks  1.Report decreased R knee pain < / = 8/10  to increase tolerance for ADLs  2.Patient goal: pain-free  3.Increase strength to >/= 4+/5 in RLE  to increase tolerance for ADL and work activities.  4. Pt will report at CJ level (20-40% impaired) on FOTO knee to demonstrate increase in LE function with every day tasks.         Plan     Continue with PT JOSE Alberts PT

## 2024-08-30 ENCOUNTER — CLINICAL SUPPORT (OUTPATIENT)
Dept: REHABILITATION | Facility: HOSPITAL | Age: 59
End: 2024-08-30
Payer: COMMERCIAL

## 2024-08-30 DIAGNOSIS — M70.50 PES ANSERINE BURSITIS: Primary | ICD-10-CM

## 2024-08-30 LAB
LEFT EYE DM RETINOPATHY: NEGATIVE
RIGHT EYE DM RETINOPATHY: NEGATIVE

## 2024-08-30 PROCEDURE — 97112 NEUROMUSCULAR REEDUCATION: CPT

## 2024-08-30 PROCEDURE — 97110 THERAPEUTIC EXERCISES: CPT

## 2024-08-30 NOTE — PROGRESS NOTES
"[facility]   Physical Therapy Treatment Note      Name: Shonda Quintanilla  Clinic Number: [id number]    Therapy Diagnosis:   Encounter Diagnosis   Name Primary?    Pes anserine bursitis Yes     Physician: Josse Anderson, *    Visit Date: 8/30/2024    Physician Orders: PT Eval and Treat   Medical Diagnosis from Referral: Left knee pain, unspecified chronicity / Pes anserine bursitis  Evaluation Date: 8/16/2024  Authorization Period Expiration: 12/31/24  Plan of Care Expiration: 11/16/24  Progress Note Due: 9/16/24  Visit # / Visits authorized: 2 / 20     FOTO: 79%  FOTO 2:   FOTO 3:  DC FOTO @: 80%    PTA Visit #: 0/5     Time In: 1207  Time Out: 100 p  Total Billable Time: 53 minutes    Subjective     Pt reports: no longer having the cramping. 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  No knee pain to speak of except when she palpates in a certain spot in the distal quad.  She was compliant with home exercise program.  Response to previous treatment: no change  Functional change: no change    Pain: 3/10  Location: Lumbar     Objective      Objective Measures updated at progress report unless specified.     Treatment     Asmita received the treatments listed below:      Asmita received therapeutic exercises to develop strength, endurance, ROM, flexibility, posture and core stabilization for 13 minutes including:   B Slump sliders 2 x  20  Sciatic nerve glides 2 x 30   B PQS w/ strap 3 x 30"    Asmita participated in neuromuscular re-education activities to improve: Balance, Coordination, Kinesthetic, Sense, Proprioception and Posture for 40 minutes. The following activities were included:  Quad sets 5" x 30  PPT 5" 3 x 10  Prone alternating multifidi 2 x 20  Prone alternating multifidi 2 3 x 30   Side lying   Hip abd 3 x 10   Clam 3 x 10   Rev clam 3 x 10      Patient Education and Home Exercises       Education provided: "   - continue with HEP  - cramping vs. Muscle working     Written Home Exercises Provided: Patient instructed to cont prior HEP. Exercises were reviewed and Asmita was able to demonstrate them prior to the end of the session.  Asmita demonstrated good  understanding of the education provided. See EMR under Patient Instructions for exercises provided during therapy sessions    Assessment     Patient returns and has not had cramping at all since she has stopped stretching and implemented nerve glides. She presents with lower back pain and anterior thigh pain since 4 hours of painting yesterday. Symptoms not consistent with a hamstring strain more along lines of lumbar radiculopathy despite COURTNEY. Hip mobility decreased which we will also address next visit.    Asmita Is progressing well towards her goals.   Pt prognosis is Good.     Pt will continue to benefit from skilled outpatient physical therapy to address the deficits listed in the problem list box on initial evaluation, provide pt/family education and to maximize pt's level of independence in the home and community environment.     Pt's spiritual, cultural and educational needs considered and pt agreeable to plan of care and goals.     Anticipated barriers to physical therapy: none    Goals:   Short Term Goals:  4 weeks  1.Report decreased R knee pain  < / =  8/10  to increase tolerance for ADLs  2. Increase knee ROM to R knee in order to be able to perform ADLs without difficulty.  3. Increase strength by 1/3 MMT grade in R knee  to increase tolerance for ADL and work activities.  4. Pt to tolerate HEP to improve ROM and independence with ADL's     Long Term Goals: 8 weeks  1.Report decreased R knee pain < / = 8/10  to increase tolerance for ADLs  2.Patient goal: pain-free  3.Increase strength to >/= 4+/5 in RLE  to increase tolerance for ADL and work activities.  4. Pt will report at CJ level (20-40% impaired) on FOTO knee to demonstrate increase in LE function  with every day tasks.         Plan     Continue with PT POC    Mary Alberts PT

## 2024-09-06 ENCOUNTER — CLINICAL SUPPORT (OUTPATIENT)
Dept: REHABILITATION | Facility: HOSPITAL | Age: 59
End: 2024-09-06
Payer: COMMERCIAL

## 2024-09-06 DIAGNOSIS — M70.50 PES ANSERINE BURSITIS: Primary | ICD-10-CM

## 2024-09-06 PROCEDURE — 97110 THERAPEUTIC EXERCISES: CPT

## 2024-09-06 PROCEDURE — 97140 MANUAL THERAPY 1/> REGIONS: CPT

## 2024-09-06 NOTE — PROGRESS NOTES
"[facility]   Physical Therapy Treatment Note      Name: Shonda Quintanilla  Clinic Number: [id number]    Therapy Diagnosis:   Encounter Diagnosis   Name Primary?    Pes anserine bursitis Yes     Physician: Josse Anderson, *    Visit Date: 9/6/2024    Physician Orders: PT Eval and Treat   Medical Diagnosis from Referral: Left knee pain, unspecified chronicity / Pes anserine bursitis  Evaluation Date: 8/16/2024  Authorization Period Expiration: 12/31/24  Plan of Care Expiration: 11/16/24  Progress Note Due: 9/16/24  Visit # / Visits authorized: 3 / 20     FOTO: 79%  FOTO 2:   FOTO 3:  DC FOTO @: 80%    PTA Visit #: 0/5     Time In: 1200  Time Out: 1257 p  Total Billable Time: 57 minutes    Subjective     Pt reports: no longer having the cramping or knee pain and was doing well until she slipped and fell at work on a wheelchair ramp. She is c/o of left hip knot and right thigh pain as well as left lower back pain. She says everything is sore including her tailbone.   She was compliant with home exercise program.  Response to previous treatment: no change  Functional change: no change    Pain: 3/10  Location: Lumbar     Objective      Objective Measures updated at progress report unless specified.     Palpation: tender to left greater trochanter.     Treatment     Asmita received the treatments listed below:      Asmita received the following manual therapy techniques: Joint mobilizations, Manual traction, Myofacial release, Soft tissue Mobilization, Friction Massage and Functional Dry Needling were applied for 10 minutes, including:  Left hip mobs lateral and inferior   Long axis distraction  Log roll    Asmita received therapeutic exercises to develop strength, endurance, ROM, flexibility, posture and core stabilization for 47 minutes including:   Supine clam RTB 40x  B hip IR 3 x 10  Bridge 3 x 10  B PQS w/ strap 3 x 30"  B Slump sliders 2 x  20  Sciatic nerve glides 2 x 30         Patient " Education and Home Exercises       Education provided:   - continue with HEP  - cramping vs. Muscle working     Written Home Exercises Provided: Patient instructed to cont prior HEP. Exercises were reviewed and Asmita was able to demonstrate them prior to the end of the session.  Asmita demonstrated good  understanding of the education provided. See EMR under Patient Instructions for exercises provided during therapy sessions    Assessment     Patient returns and was doing well but then she slipped and fell. Had pain in left hip greater trochanter. She was likely dealing with sartorius strain from initial incident with neurotension but she was pain free prior to fall. Worked on hip today. Sees sheba GARZA next Tuesday,    Asmita Is progressing well towards her goals.   Pt prognosis is Good.     Pt will continue to benefit from skilled outpatient physical therapy to address the deficits listed in the problem list box on initial evaluation, provide pt/family education and to maximize pt's level of independence in the home and community environment.     Pt's spiritual, cultural and educational needs considered and pt agreeable to plan of care and goals.     Anticipated barriers to physical therapy: none    Goals:   Short Term Goals:  4 weeks  1.Report decreased R knee pain  < / =  8/10  to increase tolerance for ADLs  2. Increase knee ROM to R knee in order to be able to perform ADLs without difficulty.  3. Increase strength by 1/3 MMT grade in R knee  to increase tolerance for ADL and work activities.  4. Pt to tolerate HEP to improve ROM and independence with ADL's     Long Term Goals: 8 weeks  1.Report decreased R knee pain < / = 8/10  to increase tolerance for ADLs  2.Patient goal: pain-free  3.Increase strength to >/= 4+/5 in RLE  to increase tolerance for ADL and work activities.  4. Pt will report at CJ level (20-40% impaired) on FOTO knee to demonstrate increase in LE function with every day tasks.         Plan      Continue with PT POC    Mary Alberts, PT

## 2024-09-10 ENCOUNTER — OFFICE VISIT (OUTPATIENT)
Dept: SPORTS MEDICINE | Facility: CLINIC | Age: 59
End: 2024-09-10
Payer: COMMERCIAL

## 2024-09-10 VITALS
SYSTOLIC BLOOD PRESSURE: 157 MMHG | HEIGHT: 63 IN | WEIGHT: 177 LBS | DIASTOLIC BLOOD PRESSURE: 91 MMHG | BODY MASS INDEX: 31.36 KG/M2 | HEART RATE: 83 BPM

## 2024-09-10 DIAGNOSIS — S76.312A: ICD-10-CM

## 2024-09-10 DIAGNOSIS — M62.89 MUSCLE TIGHTNESS: Primary | ICD-10-CM

## 2024-09-10 PROCEDURE — 99999 PR PBB SHADOW E&M-EST. PATIENT-LVL III: CPT | Mod: PBBFAC,,, | Performed by: PHYSICIAN ASSISTANT

## 2024-09-10 PROCEDURE — 99214 OFFICE O/P EST MOD 30 MIN: CPT | Mod: S$GLB,,, | Performed by: PHYSICIAN ASSISTANT

## 2024-09-10 PROCEDURE — 3080F DIAST BP >= 90 MM HG: CPT | Mod: CPTII,S$GLB,, | Performed by: PHYSICIAN ASSISTANT

## 2024-09-10 PROCEDURE — 4010F ACE/ARB THERAPY RXD/TAKEN: CPT | Mod: CPTII,S$GLB,, | Performed by: PHYSICIAN ASSISTANT

## 2024-09-10 PROCEDURE — 1159F MED LIST DOCD IN RCRD: CPT | Mod: CPTII,S$GLB,, | Performed by: PHYSICIAN ASSISTANT

## 2024-09-10 PROCEDURE — 3077F SYST BP >= 140 MM HG: CPT | Mod: CPTII,S$GLB,, | Performed by: PHYSICIAN ASSISTANT

## 2024-09-10 PROCEDURE — 3044F HG A1C LEVEL LT 7.0%: CPT | Mod: CPTII,S$GLB,, | Performed by: PHYSICIAN ASSISTANT

## 2024-09-10 PROCEDURE — 3008F BODY MASS INDEX DOCD: CPT | Mod: CPTII,S$GLB,, | Performed by: PHYSICIAN ASSISTANT

## 2024-09-10 RX ORDER — METHOCARBAMOL 500 MG/1
500 TABLET, FILM COATED ORAL 3 TIMES DAILY PRN
Qty: 40 TABLET | Refills: 0 | Status: SHIPPED | OUTPATIENT
Start: 2024-09-10

## 2024-09-10 NOTE — PROGRESS NOTES
Subjective:     Chief Complaint: Shonda Quintanilla is a 59 y.o. female who had concerns including Pain of the Left Knee.    Interval Hx: Patient presents for follow-up of Pain of the Left Knee  . The patient's last visit with me was on 7/31/2024. Reports symptoms improved significantly with physical therapy.  Started to experience new pain symptoms after a slip and fall. symptoms  Patient presents to discuss continued treatment options.       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 Pain is located over (points to) medial joint line and behind the patella knee. She reports that the pain is a 7 /10 sharp pain today. Associated symptoms include swelling. Previous treatments include CSI, which provided moderate in the past. Pain is not responding adequately to conservative measures which have included activity modifications, rest, and oral medication (baclofen, ibuprofen, tylenol). Is affecting ADLs and limiting desired level of activity. Denies numbness, tingling, radiation . Moderate pain to bear weight.  Pain is 7 /10 at its worst    Mechanical symptoms: none  Subjective instability: (+)   Worse with weightbearing and increased activity  Better with rest.   Nocturnal symptoms: (--)    No previous surgeries or recent trauma on knee                  Review of Systems   Constitutional: Negative for chills and fever.   HENT:  Negative for congestion and sore throat.    Eyes:  Negative for discharge and double vision.   Cardiovascular:  Negative for chest pain, palpitations and syncope.   Respiratory:  Negative for cough and shortness of breath.    Endocrine: Negative for cold intolerance and heat intolerance.   Skin:  Negative for dry skin and rash.   Musculoskeletal:  Positive for falls, joint pain and joint swelling.   Gastrointestinal:  Negative for abdominal pain,  nausea and vomiting.   Neurological:  Negative for focal weakness, numbness and paresthesias.       Pain Related Questions  Over the past 3 days, what was your average pain during activity? (I.e. running, jogging, walking, climbing stairs, getting dressed, ect.): 3  Over the past 3 days, what was your highest pain level?: 7  Over the past 3 days, what was your lowest pain level? : 2    Other  How many nights a week are you awakened by your affected body part?: 3  Was the patient's HEIGHT measured or patient reported?: Patient Reported  Was the patient's WEIGHT measured or patient reported?: Measured    Objective:     General: Shonda is well-developed, well-nourished, appears stated age, in no acute distress, alert and oriented to time, place and person.     General    Nursing note and vitals reviewed.  Constitutional: She is oriented to person, place, and time. She appears well-developed and well-nourished. No distress.   Eyes: Conjunctivae and EOM are normal. Pupils are equal, round, and reactive to light.   Neck: Neck supple. No JVD present.   Cardiovascular:  Normal heart sounds and intact distal pulses.            No murmur heard.  Pulmonary/Chest: Effort normal and breath sounds normal. No respiratory distress.   Abdominal: Soft. Bowel sounds are normal. She exhibits no distension. There is no abdominal tenderness.   Neurological: She is alert and oriented to person, place, and time. Coordination normal.   Psychiatric: She has a normal mood and affect. Her behavior is normal. Judgment and thought content normal.     General Musculoskeletal Exam   Gait: normal       Right Knee Exam     Inspection   Erythema: absent  Scars: absent  Swelling: absent  Effusion: absent  Deformity: absent  Bruising: absent    Range of Motion   Extension:  0   Flexion:  130     Tests   Meniscus   Angela:  Medial - negative Lateral - negative  Ligament Examination   Lachman: normal (-1 to 2mm)   PCL-Posterior Drawer: normal (0 to  2mm)     MCL - Valgus: normal (0 to 2mm)  LCL - Varus: normal  Dial Test at 90 degrees: normal (< 5 degrees)  Posterolateral Corner: stable  Patella   Patellar Glide (quadrants): Lateral - 1   Medial - 2  Patellar Grind: negative    Other   Popliteal (Baker's) Cyst: absent  Muscle Tightness: hamstring tightness  Sensation: normal    Left Knee Exam     Inspection   Erythema: absent  Scars: absent  Swelling: absent  Effusion: absent  Deformity: absent  Bruising: absent    Tenderness   The patient tender to palpation of the pes anserinus and iliotibial band (hamstrings tendon).    Range of Motion   Extension:  0   Flexion:  130     Tests   Meniscus   Angela:  Medial - negative Lateral - negative  Stability   Lachman: normal (-1 to 2mm)   PCL-Posterior Drawer: normal (0 to 2mm)  MCL - Valgus: normal (0 to 2mm)  LCL - Varus: normal (0 to 2mm)  Dial Test at 90 degrees: normal (< 5 degrees)  Posterolateral Corner: stable  Patella   Patellar Glide (Quadrants): Lateral - 1 Medial - 2  Patellar Grind: negative    Other   Popliteal (Baker's) Cyst: absent  Muscle Tightness: hamstring tightness  Sensation: normal    Right Hip Exam     Tests   Jinga: negative  Left Hip Exam     Tests   Jigna: negative          Muscle Strength   Right Lower Extremity   Hip Abduction: 5/5   Quadriceps:  5/5   Hamstrin/5   Left Lower Extremity   Hip Abduction: 5/5   Quadriceps:  5/5   Hamstrin/5     Vascular Exam     Right Pulses  Dorsalis Pedis:      2+  Posterior Tibial:      2+        Left Pulses  Dorsalis Pedis:      2+  Posterior Tibial:      2+        Edema  Right Lower Leg: absent  Left Lower Leg: absent    Radiographic findings today:    Evidence of disuse osteopenia. Mild moderate anterior proximal prominence, spur formation at anterior tibial tubercle, remote Osgood Stevenson change. Minimal tricompartment DJD changes particular left patellofemoral joint, minimal mild lateral subluxation patellofemoral joint.     Patella baja,  bilaterally    Kellgren-Trell Classification: 2    Xrays of the knees were ordered and reviewed by me today. These findings were discussed and reviewed with the patient.      Assessment:     Encounter Diagnoses   Name Primary?    Muscle tightness Yes    Strain of tendon of left hamstring muscle           Plan:     We have discussed a variety of treatment options including medications, injections, physical therapy and other alternative treatments. I also explained the indications, risks and benefits of surgery. Given the patients hx and examination today, I believe she would benefit from physical therapy. Pt agrees and would like to continue with physical therapy.    I made the decision to obtain old records of the patient including previous notes and imaging. I independently reviewed and interpreted lab results today as well as prior imaging.      1. OTC Tylenol as needed. NSAIDs contraindicated. Robaxin 500mg TID PRN  2. Continue with physical therapy with new symptoms post fall.  3. Ice compress to the affected area 2-3x a day for 15-20 minutes as needed for pain management.  4. RTC to see Elijah Anderson PA-C as needed for follow-up.      All of the patient's questions were answered and the patient will contact us if they have any questions or concerns in the interim.      Patient questionnaires may have been collected.

## 2024-09-13 ENCOUNTER — CLINICAL SUPPORT (OUTPATIENT)
Dept: REHABILITATION | Facility: HOSPITAL | Age: 59
End: 2024-09-13
Payer: COMMERCIAL

## 2024-09-13 DIAGNOSIS — M70.50 PES ANSERINE BURSITIS: Primary | ICD-10-CM

## 2024-09-13 PROCEDURE — 97110 THERAPEUTIC EXERCISES: CPT

## 2024-09-13 PROCEDURE — 97530 THERAPEUTIC ACTIVITIES: CPT

## 2024-09-13 PROCEDURE — 97112 NEUROMUSCULAR REEDUCATION: CPT

## 2024-09-13 NOTE — PROGRESS NOTES
"[facility]   Physical Therapy Treatment Note      Name: Shonda Quintanilla  Clinic Number: [id number]    Therapy Diagnosis:   No diagnosis found.    Physician: Josse Anderson, *    Visit Date: 9/13/2024    Physician Orders: PT Eval and Treat   Medical Diagnosis from Referral: Left knee pain, unspecified chronicity / Pes anserine bursitis  Evaluation Date: 8/16/2024  Authorization Period Expiration: 12/31/24  Plan of Care Expiration: 11/16/24  Progress Note Due: 9/16/24  Visit # / Visits authorized: 4 / 20     FOTO: 79%  FOTO 2:   FOTO 3:  DC FOTO @: 80%    PTA Visit #: 0/5     Time In: 857   Time Out: 955   Total Billable Time: 58 minutes    Subjective     Pt reports: feeling better. Has some knee stiffness today. PA said to continue with PT   She was compliant with home exercise program.  Response to previous treatment: no change  Functional change: no change    Pain: 0/10  Location: Lumbar     Objective      Objective Measures updated at progress report unless specified.     Palpation: tender to left greater trochanter.     Treatment     Asmita received the treatments listed below:        Asmita received therapeutic exercises to develop strength, endurance, ROM, flexibility, posture and core stabilization for 15 minutes including:   Supine clam RTB 40x    B PQS w/ strap 3 x 30"  B Slump sliders 2 x  20  Sciatic nerve glides 2 x 30     Asmita participated in neuromuscular re-education activities to improve: Balance, Coordination, Kinesthetic, Sense, Proprioception and Posture for 25 minutes. The following activities were included:    B hip IR 3 x 10  Bridge 3 x 10  PPT w/ TrA    Fallout 3'   Marches 3'    Asmita participated in dynamic functional therapeutic activities to improve functional performance for 18 minutes, including:  10 min bike   Standing B hip abd 3 x 10      Patient Education and Home Exercises       Education provided:   - continue with HEP  - cramping vs. Muscle working   "   Written Home Exercises Provided: Patient instructed to cont prior HEP. Exercises were reviewed and Asmita was able to demonstrate them prior to the end of the session.  Asmita demonstrated good  understanding of the education provided. See EMR under Patient Instructions for exercises provided during therapy sessions    Assessment     Patient returns doing better s/p fall. C/o mild knee stiffness anterior knee but overall posterior knee and cramping has subsided. Patient progressing. Continue to work on hip mobility and core/lumbar strength    Asmita Is progressing well towards her goals.   Pt prognosis is Good.     Pt will continue to benefit from skilled outpatient physical therapy to address the deficits listed in the problem list box on initial evaluation, provide pt/family education and to maximize pt's level of independence in the home and community environment.     Pt's spiritual, cultural and educational needs considered and pt agreeable to plan of care and goals.     Anticipated barriers to physical therapy: none    Goals:   Short Term Goals:  4 weeks  1.Report decreased R knee pain  < / =  8/10  to increase tolerance for ADLs  2. Increase knee ROM to R knee in order to be able to perform ADLs without difficulty.  3. Increase strength by 1/3 MMT grade in R knee  to increase tolerance for ADL and work activities.  4. Pt to tolerate HEP to improve ROM and independence with ADL's     Long Term Goals: 8 weeks  1.Report decreased R knee pain < / = 8/10  to increase tolerance for ADLs  2.Patient goal: pain-free  3.Increase strength to >/= 4+/5 in RLE  to increase tolerance for ADL and work activities.  4. Pt will report at CJ level (20-40% impaired) on FOTO knee to demonstrate increase in LE function with every day tasks.         Plan     Continue with PT JOSE Alberts PT

## 2024-09-20 ENCOUNTER — CLINICAL SUPPORT (OUTPATIENT)
Dept: REHABILITATION | Facility: HOSPITAL | Age: 59
End: 2024-09-20
Payer: COMMERCIAL

## 2024-09-20 DIAGNOSIS — M70.50 PES ANSERINE BURSITIS: Primary | ICD-10-CM

## 2024-09-20 PROCEDURE — 97110 THERAPEUTIC EXERCISES: CPT

## 2024-09-20 PROCEDURE — 97112 NEUROMUSCULAR REEDUCATION: CPT

## 2024-09-20 NOTE — PROGRESS NOTES
"[facility]   Physical Therapy Treatment Note      Name: Shonda Quintanilla  Clinic Number: [id number]    Therapy Diagnosis:   Encounter Diagnosis   Name Primary?    Pes anserine bursitis Yes       Physician: Josse Anderson, *    Visit Date: 9/20/2024    Physician Orders: PT Eval and Treat   Medical Diagnosis from Referral: Left knee pain, unspecified chronicity / Pes anserine bursitis  Evaluation Date: 8/16/2024  Authorization Period Expiration: 12/31/24  Plan of Care Expiration: 11/16/24  Progress Note Due: 9/16/24  Visit # / Visits authorized: 5 / 20     FOTO: 79%  FOTO 2:   FOTO 3:  DC FOTO @: 80%    PTA Visit #: 0/5     Time In: 1154 a   Time Out: 1250 p   Total Billable Time: 56 minutes    Subjective     Pt reports: worked a lot this week and now whole body hurts. Lower back, hip all the way down to lateral calf is throbbing.    She was partially compliant with home exercise program.  Response to previous treatment: no change  Functional change: no change    Pain: 0/10  Location: Lumbar     Objective      Objective Measures updated at progress report unless specified.     Palpation: tender to left greater trochanter.     Treatment     Asmita received the treatments listed below:        Asmita received therapeutic exercises to develop strength, endurance, ROM, flexibility, posture and core stabilization for 26 minutes including:   Supine clam RTB 40x    B PQS w/ strap 3 x 30"  B Slump sliders 3 x  10  Sciatic nerve glides 3 x 30   Prone press ups 3 x 20    Asmita participated in neuromuscular re-education activities to improve: Balance, Coordination, Kinesthetic, Sense, Proprioception and Posture for 30 minutes. The following activities were included:    Bridge bolster 3 x 10  PPT w/ TrA    Fallout 3'   Marches 3'        Patient Education and Home Exercises       Education provided:   - continue with HEP  - cramping vs. Muscle working     Written Home Exercises Provided: Patient " instructed to cont prior HEP. Exercises were reviewed and Asmita was able to demonstrate them prior to the end of the session.  Asmita demonstrated good  understanding of the education provided. See EMR under Patient Instructions for exercises provided during therapy sessions    Assessment     Patient returns with pain in lower back and all the way down left leg. Improved with extension bias. Will add to HEP and see if symptoms continue to centralize .    Asmita Is progressing well towards her goals.   Pt prognosis is Good.     Pt will continue to benefit from skilled outpatient physical therapy to address the deficits listed in the problem list box on initial evaluation, provide pt/family education and to maximize pt's level of independence in the home and community environment.     Pt's spiritual, cultural and educational needs considered and pt agreeable to plan of care and goals.     Anticipated barriers to physical therapy: none    Goals:   Short Term Goals:  4 weeks  1.Report decreased R knee pain  < / =  8/10  to increase tolerance for ADLs  2. Increase knee ROM to R knee in order to be able to perform ADLs without difficulty.  3. Increase strength by 1/3 MMT grade in R knee  to increase tolerance for ADL and work activities.  4. Pt to tolerate HEP to improve ROM and independence with ADL's     Long Term Goals: 8 weeks  1.Report decreased R knee pain < / = 8/10  to increase tolerance for ADLs  2.Patient goal: pain-free  3.Increase strength to >/= 4+/5 in RLE  to increase tolerance for ADL and work activities.  4. Pt will report at CJ level (20-40% impaired) on FOTO knee to demonstrate increase in LE function with every day tasks.         Plan     Continue with PT JOSE Alberts PT

## 2024-09-24 ENCOUNTER — PATIENT MESSAGE (OUTPATIENT)
Dept: FAMILY MEDICINE | Facility: CLINIC | Age: 59
End: 2024-09-24
Payer: COMMERCIAL

## 2024-10-01 ENCOUNTER — LAB VISIT (OUTPATIENT)
Dept: LAB | Facility: HOSPITAL | Age: 59
End: 2024-10-01
Attending: FAMILY MEDICINE
Payer: COMMERCIAL

## 2024-10-01 DIAGNOSIS — E11.42 TYPE 2 DIABETES MELLITUS WITH DIABETIC POLYNEUROPATHY, WITHOUT LONG-TERM CURRENT USE OF INSULIN: ICD-10-CM

## 2024-10-01 DIAGNOSIS — E03.9 HYPOTHYROIDISM, UNSPECIFIED TYPE: ICD-10-CM

## 2024-10-01 DIAGNOSIS — E11.9 DIABETES MELLITUS WITHOUT COMPLICATION: ICD-10-CM

## 2024-10-01 DIAGNOSIS — I10 ESSENTIAL HYPERTENSION: ICD-10-CM

## 2024-10-01 LAB
ALBUMIN/CREAT UR: 153.4 UG/MG (ref 0–30)
BASOPHILS # BLD AUTO: 0.08 K/UL (ref 0–0.2)
BASOPHILS NFR BLD: 0.7 % (ref 0–1.9)
CREAT UR-MCNC: 58 MG/DL (ref 15–325)
DIFFERENTIAL METHOD BLD: ABNORMAL
EOSINOPHIL # BLD AUTO: 0.3 K/UL (ref 0–0.5)
EOSINOPHIL NFR BLD: 2.6 % (ref 0–8)
ERYTHROCYTE [DISTWIDTH] IN BLOOD BY AUTOMATED COUNT: 13.2 % (ref 11.5–14.5)
ESTIMATED AVG GLUCOSE: 114 MG/DL (ref 68–131)
ESTIMATED AVG GLUCOSE: 117 MG/DL (ref 68–131)
HBA1C MFR BLD: 5.6 % (ref 4–5.6)
HBA1C MFR BLD: 5.7 % (ref 4–5.6)
HCT VFR BLD AUTO: 38.7 % (ref 37–48.5)
HGB BLD-MCNC: 12.7 G/DL (ref 12–16)
IMM GRANULOCYTES # BLD AUTO: 0.03 K/UL (ref 0–0.04)
IMM GRANULOCYTES NFR BLD AUTO: 0.3 % (ref 0–0.5)
LYMPHOCYTES # BLD AUTO: 3 K/UL (ref 1–4.8)
LYMPHOCYTES NFR BLD: 26.1 % (ref 18–48)
MCH RBC QN AUTO: 29.9 PG (ref 27–31)
MCHC RBC AUTO-ENTMCNC: 32.8 G/DL (ref 32–36)
MCV RBC AUTO: 91 FL (ref 82–98)
MICROALBUMIN UR DL<=1MG/L-MCNC: 89 UG/ML
MONOCYTES # BLD AUTO: 1.2 K/UL (ref 0.3–1)
MONOCYTES NFR BLD: 10.6 % (ref 4–15)
NEUTROPHILS # BLD AUTO: 6.8 K/UL (ref 1.8–7.7)
NEUTROPHILS NFR BLD: 59.7 % (ref 38–73)
NRBC BLD-RTO: 0 /100 WBC
PLATELET # BLD AUTO: 292 K/UL (ref 150–450)
PMV BLD AUTO: 10.6 FL (ref 9.2–12.9)
RBC # BLD AUTO: 4.25 M/UL (ref 4–5.4)
T3 SERPL-MCNC: 102 NG/DL (ref 60–180)
T4 FREE SERPL-MCNC: 1.58 NG/DL (ref 0.71–1.51)
TSH SERPL DL<=0.005 MIU/L-ACNC: 0.03 UIU/ML (ref 0.4–4)
WBC # BLD AUTO: 11.36 K/UL (ref 3.9–12.7)

## 2024-10-01 PROCEDURE — 83036 HEMOGLOBIN GLYCOSYLATED A1C: CPT | Performed by: FAMILY MEDICINE

## 2024-10-01 PROCEDURE — 84443 ASSAY THYROID STIM HORMONE: CPT | Performed by: FAMILY MEDICINE

## 2024-10-01 PROCEDURE — 84480 ASSAY TRIIODOTHYRONINE (T3): CPT | Performed by: FAMILY MEDICINE

## 2024-10-01 PROCEDURE — 82043 UR ALBUMIN QUANTITATIVE: CPT | Performed by: FAMILY MEDICINE

## 2024-10-01 PROCEDURE — 36415 COLL VENOUS BLD VENIPUNCTURE: CPT | Mod: PO | Performed by: FAMILY MEDICINE

## 2024-10-01 PROCEDURE — 82570 ASSAY OF URINE CREATININE: CPT | Performed by: FAMILY MEDICINE

## 2024-10-01 PROCEDURE — 84439 ASSAY OF FREE THYROXINE: CPT | Performed by: FAMILY MEDICINE

## 2024-10-01 PROCEDURE — 85025 COMPLETE CBC W/AUTO DIFF WBC: CPT | Performed by: FAMILY MEDICINE

## 2024-10-07 ENCOUNTER — PATIENT OUTREACH (OUTPATIENT)
Dept: ADMINISTRATIVE | Facility: HOSPITAL | Age: 59
End: 2024-10-07
Payer: COMMERCIAL

## 2024-10-07 DIAGNOSIS — Z12.31 ENCOUNTER FOR SCREENING MAMMOGRAM FOR MALIGNANT NEOPLASM OF BREAST: Primary | ICD-10-CM

## 2024-10-07 NOTE — PROGRESS NOTES
[facility] DM Eye Gap Report 09.23.24 -  the patient has an upcoming appointment scheduled w/ primary care on 10/08/2024. Appointment notes already added.    Non-Compliant Blood Pressure Reading - the patient has an upcoming appointment scheduled w/ primary care on 10/08/2024.

## 2024-10-08 ENCOUNTER — OFFICE VISIT (OUTPATIENT)
Dept: FAMILY MEDICINE | Facility: CLINIC | Age: 59
End: 2024-10-08
Payer: COMMERCIAL

## 2024-10-08 VITALS
WEIGHT: 174.25 LBS | SYSTOLIC BLOOD PRESSURE: 122 MMHG | TEMPERATURE: 98 F | OXYGEN SATURATION: 96 % | HEART RATE: 81 BPM | BODY MASS INDEX: 30.88 KG/M2 | HEIGHT: 63 IN | DIASTOLIC BLOOD PRESSURE: 70 MMHG

## 2024-10-08 DIAGNOSIS — E11.69 DYSLIPIDEMIA ASSOCIATED WITH TYPE 2 DIABETES MELLITUS: ICD-10-CM

## 2024-10-08 DIAGNOSIS — E66.811 CLASS 1 OBESITY DUE TO EXCESS CALORIES WITH SERIOUS COMORBIDITY AND BODY MASS INDEX (BMI) OF 30.0 TO 30.9 IN ADULT: ICD-10-CM

## 2024-10-08 DIAGNOSIS — E11.649 TYPE 2 DIABETES MELLITUS WITH HYPOGLYCEMIA WITHOUT COMA, WITHOUT LONG-TERM CURRENT USE OF INSULIN: Primary | ICD-10-CM

## 2024-10-08 DIAGNOSIS — R80.9 MICROALBUMINURIA DUE TO TYPE 2 DIABETES MELLITUS: ICD-10-CM

## 2024-10-08 DIAGNOSIS — F51.04 PSYCHOPHYSIOLOGICAL INSOMNIA: ICD-10-CM

## 2024-10-08 DIAGNOSIS — E11.29 MICROALBUMINURIA DUE TO TYPE 2 DIABETES MELLITUS: ICD-10-CM

## 2024-10-08 DIAGNOSIS — K21.9 GASTROESOPHAGEAL REFLUX DISEASE, UNSPECIFIED WHETHER ESOPHAGITIS PRESENT: ICD-10-CM

## 2024-10-08 DIAGNOSIS — E03.9 HYPOTHYROIDISM, UNSPECIFIED TYPE: ICD-10-CM

## 2024-10-08 DIAGNOSIS — E66.09 CLASS 1 OBESITY DUE TO EXCESS CALORIES WITH SERIOUS COMORBIDITY AND BODY MASS INDEX (BMI) OF 30.0 TO 30.9 IN ADULT: ICD-10-CM

## 2024-10-08 DIAGNOSIS — Z23 NEED FOR INFLUENZA VACCINATION: ICD-10-CM

## 2024-10-08 DIAGNOSIS — I10 ESSENTIAL HYPERTENSION: ICD-10-CM

## 2024-10-08 DIAGNOSIS — E78.5 DYSLIPIDEMIA ASSOCIATED WITH TYPE 2 DIABETES MELLITUS: ICD-10-CM

## 2024-10-08 PROCEDURE — 3060F POS MICROALBUMINURIA REV: CPT | Mod: CPTII,S$GLB,, | Performed by: FAMILY MEDICINE

## 2024-10-08 PROCEDURE — 3078F DIAST BP <80 MM HG: CPT | Mod: CPTII,S$GLB,, | Performed by: FAMILY MEDICINE

## 2024-10-08 PROCEDURE — 4010F ACE/ARB THERAPY RXD/TAKEN: CPT | Mod: CPTII,S$GLB,, | Performed by: FAMILY MEDICINE

## 2024-10-08 PROCEDURE — 90656 IIV3 VACC NO PRSV 0.5 ML IM: CPT | Mod: S$GLB,,, | Performed by: FAMILY MEDICINE

## 2024-10-08 PROCEDURE — 1159F MED LIST DOCD IN RCRD: CPT | Mod: CPTII,S$GLB,, | Performed by: FAMILY MEDICINE

## 2024-10-08 PROCEDURE — 99999 PR PBB SHADOW E&M-EST. PATIENT-LVL III: CPT | Mod: PBBFAC,,, | Performed by: FAMILY MEDICINE

## 2024-10-08 PROCEDURE — 1160F RVW MEDS BY RX/DR IN RCRD: CPT | Mod: CPTII,S$GLB,, | Performed by: FAMILY MEDICINE

## 2024-10-08 PROCEDURE — 3066F NEPHROPATHY DOC TX: CPT | Mod: CPTII,S$GLB,, | Performed by: FAMILY MEDICINE

## 2024-10-08 PROCEDURE — 99499 UNLISTED E&M SERVICE: CPT | Mod: S$GLB,,, | Performed by: FAMILY MEDICINE

## 2024-10-08 PROCEDURE — 90471 IMMUNIZATION ADMIN: CPT | Mod: S$GLB,,, | Performed by: FAMILY MEDICINE

## 2024-10-08 PROCEDURE — 3074F SYST BP LT 130 MM HG: CPT | Mod: CPTII,S$GLB,, | Performed by: FAMILY MEDICINE

## 2024-10-08 PROCEDURE — 3008F BODY MASS INDEX DOCD: CPT | Mod: CPTII,S$GLB,, | Performed by: FAMILY MEDICINE

## 2024-10-08 PROCEDURE — 99214 OFFICE O/P EST MOD 30 MIN: CPT | Mod: 25,S$GLB,, | Performed by: FAMILY MEDICINE

## 2024-10-08 PROCEDURE — 3044F HG A1C LEVEL LT 7.0%: CPT | Mod: CPTII,S$GLB,, | Performed by: FAMILY MEDICINE

## 2024-10-08 RX ORDER — METFORMIN HYDROCHLORIDE 500 MG/1
1000 TABLET, EXTENDED RELEASE ORAL DAILY
Qty: 180 TABLET | Refills: 3 | Status: SHIPPED | OUTPATIENT
Start: 2024-10-08 | End: 2025-10-08

## 2024-10-08 RX ORDER — LEVOTHYROXINE SODIUM 125 UG/1
125 TABLET ORAL
Qty: 30 TABLET | Refills: 11 | Status: SHIPPED | OUTPATIENT
Start: 2024-10-08 | End: 2025-10-08

## 2024-10-08 RX ORDER — TIRZEPATIDE 5 MG/.5ML
5 INJECTION, SOLUTION SUBCUTANEOUS
Qty: 4 PEN | Refills: 11 | Status: SHIPPED | OUTPATIENT
Start: 2024-10-08 | End: 2025-10-08

## 2024-10-08 RX ORDER — ZOLPIDEM TARTRATE 6.25 MG/1
6.25 TABLET, FILM COATED, EXTENDED RELEASE ORAL NIGHTLY PRN
Qty: 30 TABLET | Refills: 2 | Status: SHIPPED | OUTPATIENT
Start: 2024-10-08

## 2024-10-08 RX ORDER — OMEPRAZOLE 20 MG/1
20 CAPSULE, DELAYED RELEASE ORAL DAILY
Qty: 30 CAPSULE | Refills: 11 | Status: SHIPPED | OUTPATIENT
Start: 2024-10-08 | End: 2025-10-08

## 2024-10-08 NOTE — PROGRESS NOTES
Assessment & Plan:    Type 2 diabetes mellitus with hypoglycemia without coma, without long-term current use of insulin  -     tirzepatide (MOUNJARO) 5 mg/0.5 mL PnIj; Inject 5 mg (one pen) into the skin every 7 days.  Dispense: 4 Pen; Refill: 11  -     metFORMIN (GLUCOPHAGE-XR) 500 MG ER 24hr tablet; Take 2 tablets (1,000 mg total) by mouth once daily.  Dispense: 180 tablet; Refill: 3    Controlled. Mounjaro refilled. May D/C evening dose of metformin. Continue metformin 1000 mg daily. May tailor this back as well if hypoglycemia persists.   Eye exam done at [facility] - will obtain record.    Microalbuminuria due to type 2 diabetes mellitus  Continue BG control and ARB.    Class 1 obesity due to excess calories with serious comorbidity and body mass index (BMI) of 30.0 to 30.9 in adult  -     tirzepatide (MOUNJARO) 5 mg/0.5 mL PnIj; Inject 5 mg (one pen) into the skin every 7 days.  Dispense: 4 Pen; Refill: 11    Refilled.    Dyslipidemia associated with type 2 diabetes mellitus  Controlled. Continue current therapy.     Hypothyroidism, unspecified type  -     levothyroxine (SYNTHROID) 125 MCG tablet; Take 1 tablet (125 mcg total) by mouth before breakfast.  Dispense: 30 tablet; Refill: 11  -     T4, Free; Future; Expected date: 10/08/2024  -     TSH; Future; Expected date: 10/08/2024    TSH suppressed, T4 elevated.   Decrease dose to 125 mcg. Repeat thyroid studies in 6-8 weeks.     Essential hypertension  Controlled. Continue current therapy.     Gastroesophageal reflux disease, unspecified whether esophagitis present  -     omeprazole (PRILOSEC) 20 MG capsule; Take 1 capsule (20 mg total) by mouth once daily.  Dispense: 30 capsule; Refill: 11    Controlled. Medication(s) refilled.     Psychophysiological insomnia  -     zolpidem (AMBIEN CR) 6.25 MG CR tablet; Take 1 tablet (6.25 mg total) by mouth nightly as needed for Insomnia.  Dispense: 30 tablet; Refill: 2    Trial of Ambien CR 1 hour before bedtime. May try  "taking 1/2 tablet.    Need for influenza vaccination  -     influenza (Flulaval, Fluzone, Fluarix) 45 mcg/0.5 mL IM vaccine (> or = 6 mo) 0.5 mL      Health maintenance reviewed & addressed above.    Declines shingles vaccine.  Advised RSV vaccine when she turns 60.    Follow-up: Follow up in about 6 months (around 4/8/2025).  ______________________________________________________________________    Chief Complaint  Chief Complaint   Patient presents with    Health Maintenance       HPI  Shonda Quintanilla is a 59 y.o. female with medical diagnoses as listed in the medical history and problem list that presents to the office to follow up on her chronic conditions. She has been dealing with some stress at work, which is affecting her quality of sleep. She has been having a hard time staying asleep. Waking up in the middle of the night with sweating, feeling "spaced out", concerned about hypoglycemia. She has been taking 1 tab of metformin in the evening.     Most recent pertinent workup:     Last CBC Results:   Lab Results   Component Value Date    WBC 11.36 10/01/2024    HGB 12.7 10/01/2024    HCT 38.7 10/01/2024     10/01/2024       Last CMP Results  Lab Results   Component Value Date     09/19/2023    K 4.7 09/19/2023     09/19/2023    CO2 25 09/19/2023    BUN 21 09/19/2023    CREATININE 1.29 (H) 09/19/2023    CALCIUM 10.3 09/19/2023    ALBUMIN 4.4 09/19/2023    AST 21 09/19/2023    ALT 19 09/19/2023       Last Lipids  Lab Results   Component Value Date    CHOL 119 05/21/2024    TRIG 113 05/21/2024    HDL 38 (L) 05/21/2024    LDLCALC 58 05/21/2024       Last A1C  Lab Results   Component Value Date    HGBA1C 5.6 10/01/2024    HGBA1C 5.7 (H) 10/01/2024         Health Maintenance         Date Due Completion Date    Shingles Vaccine (1 of 2) Never done ---    Eye Exam 03/17/2023 3/17/2022    Influenza Vaccine (1) 09/01/2024 9/26/2023    COVID-19 Vaccine (4 - 2024-25 season) 09/01/2024 12/28/2021    " Mammogram 2024    Hemoglobin A1c 2025 10/1/2024    Foot Exam 2025    Lipid Panel 2025    Diabetes Urine Screening 10/01/2025 10/1/2024    Colorectal Cancer Screening 2026    Cervical Cancer Screening 2026    TETANUS VACCINE 2032    RSV Vaccine (Age 60+ and Pregnant patients) (1 - 1-dose 75+ series) 2040 ---              PAST MEDICAL HISTORY:  Past Medical History:   Diagnosis Date    Hypertension     Hypothyroid        PAST SURGICAL HISTORY:  Past Surgical History:   Procedure Laterality Date     SECTION, CLASSIC      TUBAL LIGATION      URETHRAL SLING         SOCIAL HISTORY:  Social History     Socioeconomic History    Marital status:     Number of children: 1   Tobacco Use    Smoking status: Never     Passive exposure: Never    Smokeless tobacco: Never   Substance and Sexual Activity    Alcohol use: Yes     Alcohol/week: 2.0 standard drinks of alcohol     Types: 1 Glasses of wine, 1 Shots of liquor per week     Comment: rare    Drug use: Never    Sexual activity: Yes     Partners: Male     Birth control/protection: None     Social Drivers of Health     Financial Resource Strain: Low Risk  (2024)    Overall Financial Resource Strain (CARDIA)     Difficulty of Paying Living Expenses: Not hard at all   Food Insecurity: No Food Insecurity (2024)    Hunger Vital Sign     Worried About Running Out of Food in the Last Year: Never true     Ran Out of Food in the Last Year: Never true   Transportation Needs: No Transportation Needs (2024)    Received from [facility]    PRAPARE - Transportation     Lack of Transportation (Medical): No     Lack of Transportation (Non-Medical): No   Physical Activity: Insufficiently Active (2024)    Exercise Vital Sign     Days of Exercise per Week: 2 days     Minutes of Exercise per Session: 20 min   Stress: No Stress Concern Present (2024)     [facility] Occupational [facility] - Occupational Stress Questionnaire     Feeling of Stress : Only a little   Housing Stability: Unknown (7/30/2024)    Housing Stability Vital Sign     Unable to Pay for Housing in the Last Year: No       FAMILY HISTORY:  Family History   Problem Relation Name Age of Onset    Kidney disease Mother      Hypertension Mother      Diabetes Mother      Stroke Father      Heart disease Father      Drug abuse Father      Asthma Father      Alcohol abuse Father      Drug abuse Sister      Depression Sister      Birth defects Sister      Mental illness Sister      Hypertension Sister      Drug abuse Sister      Diabetes Sister      Depression Sister      Drug abuse Brother      Drug abuse Brother      Asthma Daughter         ALLERGIES AND MEDICATIONS: updated and reviewed.  Review of patient's allergies indicates:  No Known Allergies  Current Outpatient Medications   Medication Sig Dispense Refill    atorvastatin (LIPITOR) 40 MG tablet Take 40 mg by mouth.      baclofen (LIORESAL) 10 MG tablet TAKE ONE TABLET BY MOUTH TWICE DAILY 60 tablet 5    blood sugar diagnostic Strp Place 1 strip onto the skin 3 (three) times daily. 90 each 11    blood-glucose meter Misc To check BG once daily. 1 each 0    calcium carbonate-vitamin D3 600 mg-10 mcg (400 unit) Cap Take 1 tablet by mouth once daily.      ciclopirox (LOPROX) 0.77 % Crea Apply topically.      ciclopirox (LOPROX) 0.77 % Crea Apply topically 2 (two) times daily.      coenzyme Q10 200 mg capsule Take 1 capsule by mouth 2 (two) times daily.      estradioL (ESTRACE) 0.01 % (0.1 mg/gram) vaginal cream Place 0.5 g vaginally.      fluconazole (DIFLUCAN) 150 MG Tab Take 1 tablet by mouth every other day.      icosapent ethyL (VASCEPA) 1 gram Cap Take 2 capsules by mouth 2 (two) times daily.      lancets 33 gauge Misc To check BG once daily. 200 each 11    methocarbamoL (ROBAXIN) 500 MG Tab Take 1 tablet (500 mg total) by mouth 3 (three) times  daily as needed (muscle spasms). 40 tablet 0    promethazine-dextromethorphan (PROMETHAZINE-DM) 6.25-15 mg/5 mL Syrp Take 5 mLs by mouth every 8 (eight) hours as needed (cough). 118 mL 1    terconazole (TERAZOL 3) 0.8 % vaginal cream Place 1 applicator vaginally.      valsartan (DIOVAN) 160 MG tablet Take 1 tablet by mouth 2 (two) times daily.      levothyroxine (SYNTHROID) 125 MCG tablet Take 1 tablet (125 mcg total) by mouth before breakfast. 30 tablet 11    metFORMIN (GLUCOPHAGE-XR) 500 MG ER 24hr tablet Take 2 tablets (1,000 mg total) by mouth once daily. 180 tablet 3    omega-3 fatty acids 1,000 mg Cap Take 2 capsules by mouth once daily. (Patient not taking: Reported on 10/8/2024)      omeprazole (PRILOSEC) 20 MG capsule Take 1 capsule (20 mg total) by mouth once daily. 30 capsule 11    polyethylene glycol (COLYTE) 240-22.72-6.72 -5.84 gram SolR Patient with history of Constipation/Poor Prep. Please dispense 2 kits as directed. (Patient not taking: Reported on 10/8/2024) 8000 mL 0    tirzepatide (MOUNJARO) 5 mg/0.5 mL PnIj Inject 5 mg (one pen) into the skin every 7 days. 4 Pen 11    triamcinolone (KENALOG) 0.5 % ointment Apply topically.      zolpidem (AMBIEN CR) 6.25 MG CR tablet Take 1 tablet (6.25 mg total) by mouth nightly as needed for Insomnia. 30 tablet 2     Current Facility-Administered Medications   Medication Dose Route Frequency Provider Last Rate Last Admin    influenza (Flulaval, Fluzone, Fluarix) 45 mcg/0.5 mL IM vaccine (> or = 6 mo) 0.5 mL  0.5 mL Intramuscular 1 time in Clinic/HOD              ROS  Review of Systems   Constitutional:  Positive for diaphoresis. Negative for unexpected weight change.   Endocrine: Negative for heat intolerance.   Neurological:  Negative for tremors.   Psychiatric/Behavioral:  Positive for sleep disturbance. The patient is nervous/anxious.            Physical Exam  Vitals:    10/08/24 1522   BP: 122/70   BP Location: Right arm   Pulse: 81   Temp: 97.9 °F (36.6  "°C)   TempSrc: Oral   SpO2: 96%   Weight: 79.1 kg (174 lb 4.4 oz)   Height: 5' 3" (1.6 m)    Body mass index is 30.87 kg/m².  Weight: 79.1 kg (174 lb 4.4 oz)   Height: 5' 3" (160 cm)   Physical Exam  Constitutional:       General: She is not in acute distress.     Appearance: She is obese.   HENT:      Head: Normocephalic and atraumatic.   Neck:      Thyroid: No thyromegaly.      Vascular: No carotid bruit.   Cardiovascular:      Rate and Rhythm: Normal rate and regular rhythm.      Pulses: Normal pulses.      Heart sounds: Normal heart sounds.   Pulmonary:      Effort: Pulmonary effort is normal. No respiratory distress.      Breath sounds: Normal breath sounds.   Musculoskeletal:      Cervical back: Neck supple.      Right lower leg: No edema.      Left lower leg: No edema.   Lymphadenopathy:      Cervical: No cervical adenopathy.   Skin:     General: Skin is warm and dry.      Findings: No rash.   Neurological:      Mental Status: She is alert and oriented to person, place, and time. Mental status is at baseline.   Psychiatric:         Mood and Affect: Mood normal.         Behavior: Behavior normal.         Thought Content: Thought content normal.             "

## 2024-10-09 ENCOUNTER — PATIENT OUTREACH (OUTPATIENT)
Dept: ADMINISTRATIVE | Facility: HOSPITAL | Age: 59
End: 2024-10-09
Payer: COMMERCIAL

## 2024-10-09 RX ORDER — NITROFURANTOIN 25; 75 MG/1; MG/1
CAPSULE ORAL
COMMUNITY
Start: 2024-09-12

## 2024-10-09 RX ORDER — LEVOTHYROXINE SODIUM 137 UG/1
137 TABLET ORAL
COMMUNITY
Start: 2024-10-08

## 2024-10-09 NOTE — LETTER
AUTHORIZATION FOR RELEASE OF   CONFIDENTIAL INFORMATION    Dear Dr. Gallo Maya,    We are seeing Shonda Quintanilla, date of birth 1965, in the clinic at [facility] FAMILY MED/ INTERNAL MED/ PEDS. Britney Byrd DO is the patient's PCP. Shonda Quintanilla has an outstanding lab/procedure at the time we reviewed her chart. In order to help keep her health information updated, she has authorized us to request the following medical record(s):        (  )  MAMMOGRAM                                      (  )  COLONOSCOPY      (  )  PAP SMEAR                                          (  )  OUTSIDE LAB RESULTS     (  )  DEXA SCAN                                          ( x ) EYE EXAM(diabetic) [id number]            (  )  FOOT EXAM                                          (  )  ENTIRE RECORD     (  )  OUTSIDE IMMUNIZATIONS                 (  )  _______________         Please fax records to [facility], Adams, Lyndsey R., DO,  [phone].     If you have any questions, please contact Breonna Castellanos LPN [facility] at   [phone].     Patient Name: Shonda Quintanilla  : 1965  Patient Phone #: [phone]

## 2024-10-09 NOTE — PROGRESS NOTES
Overdue Diabetic Eye Exam - A request was sent today for patient's record.    Eye Exam  Received: Yesterday  Britney Byrd, Breonna Mayer LPN  Done at [facility] this year.

## 2024-10-18 ENCOUNTER — TELEPHONE (OUTPATIENT)
Dept: FAMILY MEDICINE | Facility: CLINIC | Age: 59
End: 2024-10-18
Payer: COMMERCIAL

## 2024-10-18 NOTE — TELEPHONE ENCOUNTER
L/M  about diabetic eye exam, have you seen the eye doctor in the last year if so can you reply with who and where you seen the eye doctor. If not we can schedule you for a diabatic eye retina exam just to take a picture of your eye here at the [facility] office 2nd floor . Please reply  yes or no vs   Thanks

## 2024-11-11 ENCOUNTER — PATIENT OUTREACH (OUTPATIENT)
Dept: ADMINISTRATIVE | Facility: HOSPITAL | Age: 59
End: 2024-11-11
Payer: COMMERCIAL

## 2024-11-11 DIAGNOSIS — Z12.31 BREAST CANCER SCREENING BY MAMMOGRAM: Primary | ICD-10-CM

## 2024-11-11 NOTE — LETTER
AUTHORIZATION FOR RELEASE OF   CONFIDENTIAL INFORMATION        We are seeing Shonda Quintanilla, date of birth 1965, in the clinic at [facility] FAMILY MED/ INTERNAL MED/ PEDS. Britney Byrd DO is the patient's PCP. Shonda Quintanilla has an outstanding lab/procedure at the time we reviewed her chart. In order to help keep her health information updated, she has authorized us to request the following medical record(s):                                              (  )  DM EYE EXAM                Please fax records to [facility],  [phone]       If you have any questions, please contact the clinic at [phone].           Patient Name: Shonda Quintanilla  : 1965  Patient Phone #: [phone]

## 2024-11-22 ENCOUNTER — LAB VISIT (OUTPATIENT)
Dept: LAB | Facility: HOSPITAL | Age: 59
End: 2024-11-22
Attending: FAMILY MEDICINE
Payer: COMMERCIAL

## 2024-11-22 DIAGNOSIS — E03.9 HYPOTHYROIDISM, UNSPECIFIED TYPE: ICD-10-CM

## 2024-11-22 LAB
T4 FREE SERPL-MCNC: 1.46 NG/DL (ref 0.71–1.51)
TSH SERPL DL<=0.005 MIU/L-ACNC: 0.18 UIU/ML (ref 0.4–4)

## 2024-11-22 PROCEDURE — 36415 COLL VENOUS BLD VENIPUNCTURE: CPT | Mod: PO | Performed by: FAMILY MEDICINE

## 2024-11-22 PROCEDURE — 84439 ASSAY OF FREE THYROXINE: CPT | Performed by: FAMILY MEDICINE

## 2024-11-22 PROCEDURE — 84443 ASSAY THYROID STIM HORMONE: CPT | Performed by: FAMILY MEDICINE

## 2024-12-01 ENCOUNTER — PATIENT MESSAGE (OUTPATIENT)
Dept: FAMILY MEDICINE | Facility: CLINIC | Age: 59
End: 2024-12-01
Payer: COMMERCIAL

## 2024-12-01 DIAGNOSIS — E03.9 HYPOTHYROIDISM, UNSPECIFIED TYPE: Primary | ICD-10-CM

## 2024-12-02 RX ORDER — LEVOTHYROXINE SODIUM 112 UG/1
112 TABLET ORAL
Qty: 30 TABLET | Refills: 11 | Status: SHIPPED | OUTPATIENT
Start: 2024-12-02 | End: 2025-12-02

## 2025-01-10 ENCOUNTER — PATIENT MESSAGE (OUTPATIENT)
Dept: FAMILY MEDICINE | Facility: CLINIC | Age: 60
End: 2025-01-10
Payer: COMMERCIAL

## 2025-01-10 DIAGNOSIS — M62.89 MUSCLE TIGHTNESS: ICD-10-CM

## 2025-01-10 DIAGNOSIS — E66.09 CLASS 1 OBESITY DUE TO EXCESS CALORIES WITH SERIOUS COMORBIDITY AND BODY MASS INDEX (BMI) OF 30.0 TO 30.9 IN ADULT: ICD-10-CM

## 2025-01-10 DIAGNOSIS — E03.9 HYPOTHYROIDISM, UNSPECIFIED TYPE: ICD-10-CM

## 2025-01-10 DIAGNOSIS — E11.42 TYPE 2 DIABETES MELLITUS WITH DIABETIC POLYNEUROPATHY, WITHOUT LONG-TERM CURRENT USE OF INSULIN: ICD-10-CM

## 2025-01-10 DIAGNOSIS — F51.04 PSYCHOPHYSIOLOGICAL INSOMNIA: ICD-10-CM

## 2025-01-10 DIAGNOSIS — M79.10 MYALGIA, UNSPECIFIED SITE: ICD-10-CM

## 2025-01-10 DIAGNOSIS — E11.649 TYPE 2 DIABETES MELLITUS WITH HYPOGLYCEMIA WITHOUT COMA, WITHOUT LONG-TERM CURRENT USE OF INSULIN: ICD-10-CM

## 2025-01-10 DIAGNOSIS — K21.9 GASTROESOPHAGEAL REFLUX DISEASE, UNSPECIFIED WHETHER ESOPHAGITIS PRESENT: ICD-10-CM

## 2025-01-10 DIAGNOSIS — E66.811 CLASS 1 OBESITY DUE TO EXCESS CALORIES WITH SERIOUS COMORBIDITY AND BODY MASS INDEX (BMI) OF 30.0 TO 30.9 IN ADULT: ICD-10-CM

## 2025-01-10 RX ORDER — METHOCARBAMOL 500 MG/1
500 TABLET, FILM COATED ORAL 3 TIMES DAILY PRN
Qty: 40 TABLET | Refills: 0 | Status: CANCELLED | OUTPATIENT
Start: 2025-01-10

## 2025-01-10 RX ORDER — OMEPRAZOLE 20 MG/1
20 CAPSULE, DELAYED RELEASE ORAL DAILY
Qty: 90 CAPSULE | Refills: 2 | Status: SHIPPED | OUTPATIENT
Start: 2025-01-10

## 2025-01-10 RX ORDER — TIRZEPATIDE 5 MG/.5ML
5 INJECTION, SOLUTION SUBCUTANEOUS
Qty: 12 PEN | Refills: 0 | Status: SHIPPED | OUTPATIENT
Start: 2025-01-10

## 2025-01-10 NOTE — TELEPHONE ENCOUNTER
No care due was identified.  [facility] Embedded Care Due Messages. Reference number: [id number].   1/10/2025 5:55:06 PM CST

## 2025-01-10 NOTE — TELEPHONE ENCOUNTER
Refill Routing Note   Medication(s) are not appropriate for processing by [facility] for the following reason(s):        No active prescription written by provider: Lipitor, Estrace, Meter, Lancets  New or recently adjusted medication: Synthroid  Required labs outdated: CMP & mammogram  Outside of protocol: Ambien, Terazol, Baclofen, Calcium Carbonate/Vitamin D3    [facility] action(s):  Defer  Route  Quick Discontinue  Approve   Requires labs : Yes - A1C due 3/31/25   DM Supplies: frequency increased to TID 4/5/24 - updated directions for provider's review  Synthroid: decreased to 112 mcg daily 12/2/24         Appointments  past 12m or future 3m with PCP    Date Provider   Last Visit   10/8/2024 Britney Byrd, DO   Next Visit   1/10/2025 Britney Byrd, DO   ED visits in past 90 days: 0        Note composed:5:58 PM 01/10/2025

## 2025-01-10 NOTE — TELEPHONE ENCOUNTER
Refill Routing Note   Medication(s) are not appropriate for processing by [facility] for the following reason(s):        Outside of protocol  No active prescription written by PCP    [facility] action(s):  Route             Appointments  past 12m or future 3m with PCP    Date Provider   Last Visit   10/8/2024 Britney Byrd, DO   Next Visit   4/22/2025 Britney Byrd, DO   ED visits in past 90 days: 0        Note composed:5:54 PM 01/10/2025

## 2025-01-10 NOTE — TELEPHONE ENCOUNTER
Care Due:                  Date            Visit Type   Department     Provider  --------------------------------------------------------------------------------                                [facility]                              PRIMARY      MED/ INTERNAL  Last Visit: 10-      CARE ([facility])   MED/ PEDS      Britney Byrd                              [facility]      MED/ INTERNAL  Next Visit: 04-      CARE ([facility])   MED/ PEDS      Britney Byrd                                                            Last  Test          Frequency    Reason                     Performed    Due Date  --------------------------------------------------------------------------------    Cr..........  12 months..  metFORMIN................  09- 09-    HBA1C.......  6 months...  metFORMIN, tirzepatide...  10-   03-    [facility] Embedded Care Due Messages. Reference number: [id number].   1/10/2025 2:57:52 PM CST

## 2025-01-12 RX ORDER — TERCONAZOLE 8 MG/G
1 CREAM VAGINAL NIGHTLY
Qty: 20 G | Refills: 0 | OUTPATIENT
Start: 2025-01-12

## 2025-01-12 RX ORDER — ESTRADIOL 0.1 MG/G
0.5 CREAM VAGINAL
Qty: 42.5 G | Refills: 3 | Status: SHIPPED | OUTPATIENT
Start: 2025-01-13

## 2025-01-12 RX ORDER — DEXTROSE 4 G
TABLET,CHEWABLE ORAL
Qty: 1 EACH | Refills: 0 | OUTPATIENT
Start: 2025-01-12

## 2025-01-12 RX ORDER — VALSARTAN 160 MG/1
160 TABLET ORAL 2 TIMES DAILY
Qty: 180 TABLET | Refills: 3 | Status: SHIPPED | OUTPATIENT
Start: 2025-01-12

## 2025-01-12 RX ORDER — ATORVASTATIN CALCIUM 40 MG/1
40 TABLET, FILM COATED ORAL DAILY
Qty: 90 TABLET | Refills: 3 | Status: SHIPPED | OUTPATIENT
Start: 2025-01-12

## 2025-01-12 RX ORDER — LEVOTHYROXINE SODIUM 112 UG/1
112 TABLET ORAL
Qty: 90 TABLET | Refills: 3 | Status: SHIPPED | OUTPATIENT
Start: 2025-01-12

## 2025-01-12 RX ORDER — METFORMIN HYDROCHLORIDE 500 MG/1
1000 TABLET, EXTENDED RELEASE ORAL DAILY
Qty: 180 TABLET | Refills: 3 | Status: SHIPPED | OUTPATIENT
Start: 2025-01-12

## 2025-01-12 RX ORDER — CHROMIUM PICOLINATE 200 MCG
1 TABLET ORAL DAILY
Qty: 100 CAPSULE | Refills: 0 | OUTPATIENT
Start: 2025-01-12

## 2025-01-12 RX ORDER — BACLOFEN 10 MG/1
10 TABLET ORAL 2 TIMES DAILY
Qty: 60 TABLET | Refills: 5 | Status: SHIPPED | OUTPATIENT
Start: 2025-01-12

## 2025-01-12 RX ORDER — ZOLPIDEM TARTRATE 6.25 MG/1
6.25 TABLET, FILM COATED, EXTENDED RELEASE ORAL NIGHTLY PRN
Qty: 30 TABLET | Refills: 2 | OUTPATIENT
Start: 2025-01-12

## 2025-01-12 RX ORDER — LANCETS 33 GAUGE
EACH MISCELLANEOUS
Qty: 300 EACH | Refills: 3 | Status: SHIPPED | OUTPATIENT
Start: 2025-01-12

## 2025-01-13 RX ORDER — METHOCARBAMOL 500 MG/1
500 TABLET, FILM COATED ORAL 3 TIMES DAILY PRN
Qty: 40 TABLET | Refills: 0 | Status: SHIPPED | OUTPATIENT
Start: 2025-01-13

## 2025-02-03 ENCOUNTER — PATIENT MESSAGE (OUTPATIENT)
Dept: FAMILY MEDICINE | Facility: CLINIC | Age: 60
End: 2025-02-03
Payer: COMMERCIAL

## 2025-02-03 DIAGNOSIS — K21.9 GASTROESOPHAGEAL REFLUX DISEASE, UNSPECIFIED WHETHER ESOPHAGITIS PRESENT: ICD-10-CM

## 2025-02-03 DIAGNOSIS — E11.649 TYPE 2 DIABETES MELLITUS WITH HYPOGLYCEMIA WITHOUT COMA, WITHOUT LONG-TERM CURRENT USE OF INSULIN: ICD-10-CM

## 2025-02-03 DIAGNOSIS — E03.9 HYPOTHYROIDISM, UNSPECIFIED TYPE: ICD-10-CM

## 2025-02-03 DIAGNOSIS — E66.811 CLASS 1 OBESITY DUE TO EXCESS CALORIES WITH SERIOUS COMORBIDITY AND BODY MASS INDEX (BMI) OF 30.0 TO 30.9 IN ADULT: ICD-10-CM

## 2025-02-03 DIAGNOSIS — E66.09 CLASS 1 OBESITY DUE TO EXCESS CALORIES WITH SERIOUS COMORBIDITY AND BODY MASS INDEX (BMI) OF 30.0 TO 30.9 IN ADULT: ICD-10-CM

## 2025-02-04 RX ORDER — TIRZEPATIDE 5 MG/.5ML
5 INJECTION, SOLUTION SUBCUTANEOUS
Qty: 12 PEN | Refills: 3 | Status: SHIPPED | OUTPATIENT
Start: 2025-02-04

## 2025-02-04 NOTE — TELEPHONE ENCOUNTER
Care Due:                  Date            Visit Type   Department     Provider  --------------------------------------------------------------------------------                                [facility]                              PRIMARY      MED/ INTERNAL  Last Visit: 10-      CARE ([facility])   MED/ PEDS      Britney Byrd                              [facility]                              PRIMARY      MED/ INTERNAL  Next Visit: 04-      CARE ([facility])   MED/ PEDS      Britney Byrd                                                            Last  Test          Frequency    Reason                     Performed    Due Date  --------------------------------------------------------------------------------    CMP.........  12 months..  atorvastatin, metFORMIN,   09- 09-                             valsartan................    Lipid Panel.  12 months..  atorvastatin.............  09- 09-    [facility] Embedded Care Due Messages. Reference number: [id number].   2/04/2025 4:24:43 PM CST

## 2025-02-10 ENCOUNTER — PATIENT MESSAGE (OUTPATIENT)
Dept: FAMILY MEDICINE | Facility: CLINIC | Age: 60
End: 2025-02-10
Payer: COMMERCIAL

## 2025-02-10 DIAGNOSIS — E11.649 TYPE 2 DIABETES MELLITUS WITH HYPOGLYCEMIA WITHOUT COMA, WITHOUT LONG-TERM CURRENT USE OF INSULIN: ICD-10-CM

## 2025-02-10 DIAGNOSIS — K21.9 GASTROESOPHAGEAL REFLUX DISEASE, UNSPECIFIED WHETHER ESOPHAGITIS PRESENT: ICD-10-CM

## 2025-02-10 DIAGNOSIS — E03.9 HYPOTHYROIDISM, UNSPECIFIED TYPE: ICD-10-CM

## 2025-02-10 RX ORDER — ATORVASTATIN CALCIUM 40 MG/1
40 TABLET, FILM COATED ORAL DAILY
Qty: 90 TABLET | Refills: 3 | Status: SHIPPED | OUTPATIENT
Start: 2025-02-10

## 2025-02-10 RX ORDER — LEVOTHYROXINE SODIUM 112 UG/1
112 TABLET ORAL
Qty: 90 TABLET | Refills: 3 | Status: SHIPPED | OUTPATIENT
Start: 2025-02-10

## 2025-02-10 RX ORDER — METFORMIN HYDROCHLORIDE 500 MG/1
1000 TABLET, EXTENDED RELEASE ORAL DAILY
Qty: 180 TABLET | Refills: 0 | Status: SHIPPED | OUTPATIENT
Start: 2025-02-10

## 2025-02-10 RX ORDER — VALSARTAN 160 MG/1
160 TABLET ORAL 2 TIMES DAILY
Qty: 180 TABLET | Refills: 3 | Status: SHIPPED | OUTPATIENT
Start: 2025-02-10

## 2025-02-10 RX ORDER — OMEPRAZOLE 20 MG/1
20 CAPSULE, DELAYED RELEASE ORAL DAILY
Qty: 90 CAPSULE | Refills: 3 | Status: SHIPPED | OUTPATIENT
Start: 2025-02-10

## 2025-02-10 RX ORDER — LEVOTHYROXINE SODIUM 112 UG/1
112 TABLET ORAL
Qty: 90 TABLET | Refills: 2 | OUTPATIENT
Start: 2025-02-10

## 2025-02-10 RX ORDER — OMEPRAZOLE 20 MG/1
20 CAPSULE, DELAYED RELEASE ORAL DAILY
Qty: 90 CAPSULE | Refills: 2 | Status: SHIPPED | OUTPATIENT
Start: 2025-02-10

## 2025-02-10 NOTE — TELEPHONE ENCOUNTER
No care due was identified.  [facility] Embedded Care Due Messages. Reference number: [id number].   2/10/2025 4:03:03 PM CST

## 2025-02-10 NOTE — TELEPHONE ENCOUNTER
Refill Routing Note   Medication(s) are not appropriate for processing by [facility] for the following reason(s):        New or recently adjusted medication    [facility] action(s):  Defer  Approve        Medication Therapy Plan: Valsartan and atorvastatin new start (1/12/25); Levothyroxine 112 mcg new start (12/2/24)      Appointments  past 12m or future 3m with PCP    Date Provider   Last Visit   10/8/2024 Britney Byrd, DO   Next Visit   4/22/2025 Britney Byrd, DO   ED visits in past 90 days: 0        Note composed:5:14 PM 02/10/2025

## 2025-04-22 ENCOUNTER — LAB VISIT (OUTPATIENT)
Dept: LAB | Facility: HOSPITAL | Age: 60
End: 2025-04-22
Attending: FAMILY MEDICINE
Payer: COMMERCIAL

## 2025-04-22 ENCOUNTER — RESULTS FOLLOW-UP (OUTPATIENT)
Dept: FAMILY MEDICINE | Facility: CLINIC | Age: 60
End: 2025-04-22

## 2025-04-22 ENCOUNTER — OFFICE VISIT (OUTPATIENT)
Dept: FAMILY MEDICINE | Facility: CLINIC | Age: 60
End: 2025-04-22
Payer: COMMERCIAL

## 2025-04-22 VITALS
OXYGEN SATURATION: 97 % | WEIGHT: 177.38 LBS | HEIGHT: 63 IN | SYSTOLIC BLOOD PRESSURE: 118 MMHG | RESPIRATION RATE: 18 BRPM | BODY MASS INDEX: 31.43 KG/M2 | DIASTOLIC BLOOD PRESSURE: 80 MMHG | HEART RATE: 81 BPM | TEMPERATURE: 98 F

## 2025-04-22 DIAGNOSIS — E66.811 CLASS 1 OBESITY DUE TO EXCESS CALORIES WITH SERIOUS COMORBIDITY AND BODY MASS INDEX (BMI) OF 31.0 TO 31.9 IN ADULT: ICD-10-CM

## 2025-04-22 DIAGNOSIS — F51.04 PSYCHOPHYSIOLOGICAL INSOMNIA: ICD-10-CM

## 2025-04-22 DIAGNOSIS — E78.5 DYSLIPIDEMIA ASSOCIATED WITH TYPE 2 DIABETES MELLITUS: ICD-10-CM

## 2025-04-22 DIAGNOSIS — I10 ESSENTIAL HYPERTENSION: ICD-10-CM

## 2025-04-22 DIAGNOSIS — E11.29 MICROALBUMINURIA DUE TO TYPE 2 DIABETES MELLITUS: ICD-10-CM

## 2025-04-22 DIAGNOSIS — M62.89 MUSCLE TIGHTNESS: ICD-10-CM

## 2025-04-22 DIAGNOSIS — R80.9 MICROALBUMINURIA DUE TO TYPE 2 DIABETES MELLITUS: ICD-10-CM

## 2025-04-22 DIAGNOSIS — M70.61 TROCHANTERIC BURSITIS OF RIGHT HIP: ICD-10-CM

## 2025-04-22 DIAGNOSIS — E03.9 HYPOTHYROIDISM, UNSPECIFIED TYPE: ICD-10-CM

## 2025-04-22 DIAGNOSIS — E11.42 TYPE 2 DIABETES MELLITUS WITH DIABETIC POLYNEUROPATHY, WITHOUT LONG-TERM CURRENT USE OF INSULIN: Primary | ICD-10-CM

## 2025-04-22 DIAGNOSIS — E66.09 CLASS 1 OBESITY DUE TO EXCESS CALORIES WITH SERIOUS COMORBIDITY AND BODY MASS INDEX (BMI) OF 31.0 TO 31.9 IN ADULT: ICD-10-CM

## 2025-04-22 DIAGNOSIS — E11.69 DYSLIPIDEMIA ASSOCIATED WITH TYPE 2 DIABETES MELLITUS: ICD-10-CM

## 2025-04-22 DIAGNOSIS — K21.9 GASTROESOPHAGEAL REFLUX DISEASE, UNSPECIFIED WHETHER ESOPHAGITIS PRESENT: ICD-10-CM

## 2025-04-22 LAB
T4 FREE SERPL-MCNC: 1.21 NG/DL (ref 0.71–1.51)
T4 FREE SERPL-MCNC: 1.21 NG/DL (ref 0.71–1.51)
TSH SERPL-ACNC: 0.59 UIU/ML (ref 0.4–4)

## 2025-04-22 PROCEDURE — 1159F MED LIST DOCD IN RCRD: CPT | Mod: CPTII,S$GLB,, | Performed by: FAMILY MEDICINE

## 2025-04-22 PROCEDURE — 36415 COLL VENOUS BLD VENIPUNCTURE: CPT | Mod: PO

## 2025-04-22 PROCEDURE — 99214 OFFICE O/P EST MOD 30 MIN: CPT | Mod: S$GLB,,, | Performed by: FAMILY MEDICINE

## 2025-04-22 PROCEDURE — G2211 COMPLEX E/M VISIT ADD ON: HCPCS | Mod: S$GLB,,, | Performed by: FAMILY MEDICINE

## 2025-04-22 PROCEDURE — 4010F ACE/ARB THERAPY RXD/TAKEN: CPT | Mod: CPTII,S$GLB,, | Performed by: FAMILY MEDICINE

## 2025-04-22 PROCEDURE — 3079F DIAST BP 80-89 MM HG: CPT | Mod: CPTII,S$GLB,, | Performed by: FAMILY MEDICINE

## 2025-04-22 PROCEDURE — 84439 ASSAY OF FREE THYROXINE: CPT

## 2025-04-22 PROCEDURE — 3074F SYST BP LT 130 MM HG: CPT | Mod: CPTII,S$GLB,, | Performed by: FAMILY MEDICINE

## 2025-04-22 PROCEDURE — 99999 PR PBB SHADOW E&M-EST. PATIENT-LVL V: CPT | Mod: PBBFAC,,, | Performed by: FAMILY MEDICINE

## 2025-04-22 PROCEDURE — 3008F BODY MASS INDEX DOCD: CPT | Mod: CPTII,S$GLB,, | Performed by: FAMILY MEDICINE

## 2025-04-22 RX ORDER — ZOLPIDEM TARTRATE 6.25 MG/1
6.25 TABLET, FILM COATED, EXTENDED RELEASE ORAL NIGHTLY PRN
Qty: 90 TABLET | Refills: 3 | Status: SHIPPED | OUTPATIENT
Start: 2025-04-22

## 2025-04-22 RX ORDER — METHOCARBAMOL 1000 MG/1
1000 TABLET, FILM COATED ORAL 3 TIMES DAILY PRN
Qty: 90 TABLET | Refills: 3 | Status: SHIPPED | OUTPATIENT
Start: 2025-04-22 | End: 2025-04-23 | Stop reason: DRUGHIGH

## 2025-04-22 NOTE — PROGRESS NOTES
Assessment & Plan:    Type 2 diabetes mellitus with diabetic polyneuropathy, without long-term current use of insulin  -     tirzepatide 7.5 mg/0.5 mL PnIj; Inject 7.5 mg into the skin every 7 days.  Dispense: 2 mL; Refill: 11  -     CBC Auto Differential; Future; Expected date: 04/22/2025  -     Comprehensive Metabolic Panel; Future; Expected date: 04/22/2025  -     Hemoglobin A1C; Future; Expected date: 04/22/2025  -     Lipid Panel; Future; Expected date: 04/22/2025  -     Microalbumin/Creatinine Ratio, Urine; Future; Expected date: 10/22/2025    Class 1 obesity due to excess calories with serious comorbidity and body mass index (BMI) of 31.0 to 31.9 in adult  -     tirzepatide 7.5 mg/0.5 mL PnIj; Inject 7.5 mg into the skin every 7 days.  Dispense: 2 mL; Refill: 11    Historically well controlled.   May increase Mounjaro to 7.5 mg.  Patient was informed of the potential for her weight to plateau on this dose.  Monitor for GI side effects.  Foot exam performed.  May take alpha lipoic acid for neuropathy.  Repeat labs before follow-up in 6 months.    Microalbuminuria due to type 2 diabetes mellitus  -     Microalbumin/Creatinine Ratio, Urine; Future; Expected date: 10/22/2025    Continue diabetes control as stated above and valsartan.    Dyslipidemia associated with type 2 diabetes mellitus  -     Lipid Panel; Future; Expected date: 04/22/2025    Essential hypertension  -     CBC Auto Differential; Future; Expected date: 04/22/2025  -     Comprehensive Metabolic Panel; Future; Expected date: 04/22/2025    Controlled. Continue current therapy.     Hypothyroidism, unspecified type  -     TSH; Future; Expected date: 04/22/2025  -     T4, Free; Future; Expected date: 04/22/2025    Controlled. Continue current therapy.     Gastroesophageal reflux disease, unspecified whether esophagitis present  Controlled. See above regarding Mounjaro.    Psychophysiological insomnia  -     zolpidem (AMBIEN CR) 6.25 MG CR tablet; Take  1 tablet (6.25 mg total) by mouth nightly as needed for Insomnia.  Dispense: 90 tablet; Refill: 3    Controlled. Continue current therapy.     Trochanteric bursitis of right hip  -     methocarbamoL 1,000 mg Tab; Take 1,000 mg by mouth 3 (three) times daily as needed (muscle spasms).  Dispense: 90 tablet; Refill: 3    Muscle tightness  -     methocarbamoL 1,000 mg Tab; Take 1,000 mg by mouth 3 (three) times daily as needed (muscle spasms).  Dispense: 90 tablet; Refill: 3    Muscle relaxer refilled.  Discussed trochanteric bursitis is most likely etiology of her right hip pain, which seems to be improving.  Emphasized importance of weight-bearing exercises to prevent delayed onset soreness from the increase her activity level at work.      Recommended RSV vaccine.    Follow-up: Follow up in about 6 months (around 10/22/2025).  ______________________________________________________________________    Chief Complaint  Chief Complaint   Patient presents with    Results     Lab work        HPI  Shonda Quintanilla is a 60 y.o. female with medical diagnoses as listed in the medical history and problem list that presents to the office to follow up on her chronic conditions. She has noticed a plateau in her weight and would like to increase Mounjaro dose. She has been experiencing right hip pain since a mechanical fall and a vacation to [place], as well as generalized delayed onset muscle soreness from working 4 days a week. She has been taking her muscle relaxers, stretching her hip, and using heat for pain relief.     Health Maintenance         Date Due Completion Date    Shingles Vaccine (1 of 2) Never done ---    COVID-19 Vaccine (4 - 2024-25 season) 09/01/2024 12/28/2021    RSV Vaccine (Age 60+ and Pregnant patients) (1 - Risk 60-74 years 1-dose series) Never done ---    Hemoglobin A1c 04/01/2025 10/1/2024    Lipid Panel 05/21/2025 5/21/2024    Diabetic Eye Exam 08/30/2025 8/30/2024    Diabetes Urine Screening 10/01/2025  "10/1/2024    Mammogram 2026    Colorectal Cancer Screening 2026    Foot Exam 2026 (Done)    Override on 2025: Done    Cervical Cancer Screening 2027    TETANUS VACCINE 2032              PAST MEDICAL HISTORY:  Past Medical History:   Diagnosis Date    Hypertension     Hypothyroid        PAST SURGICAL HISTORY:  Past Surgical History:   Procedure Laterality Date     SECTION, CLASSIC      TUBAL LIGATION      URETHRAL SLING         SOCIAL HISTORY:  Social History[1]    FAMILY HISTORY:  Family History   Problem Relation Name Age of Onset    Kidney disease Mother      Hypertension Mother      Diabetes Mother      Stroke Father      Heart disease Father      Drug abuse Father      Asthma Father      Alcohol abuse Father      Drug abuse Sister      Depression Sister      Birth defects Sister      Mental illness Sister      Hypertension Sister      Drug abuse Sister      Diabetes Sister      Depression Sister      Drug abuse Brother      Drug abuse Brother      Asthma Daughter         ALLERGIES AND MEDICATIONS: updated and reviewed.  Review of patient's allergies indicates:  No Known Allergies  Current Medications[2]      ROS  Review of Systems   Constitutional:  Negative for activity change, appetite change and unexpected weight change.   Musculoskeletal:  Positive for arthralgias.           Physical Exam  Vitals:    25 1543   BP: 118/80   Patient Position: Sitting   Pulse: 81   Resp: 18   Temp: 98.4 °F (36.9 °C)   TempSrc: Oral   SpO2: 97%   Weight: 80.4 kg (177 lb 5.8 oz)   Height: 5' 3" (1.6 m)    Body mass index is 31.42 kg/m².  Weight: 80.4 kg (177 lb 5.8 oz)   Height: 5' 3" (160 cm)   Physical Exam  Constitutional:       General: She is not in acute distress.     Appearance: She is obese.   HENT:      Head: Normocephalic and atraumatic.   Neck:      Thyroid: No thyromegaly.      Vascular: No carotid bruit. "   Cardiovascular:      Rate and Rhythm: Normal rate and regular rhythm.      Pulses: Normal pulses.      Heart sounds: Normal heart sounds.   Pulmonary:      Effort: Pulmonary effort is normal. No respiratory distress.      Breath sounds: Normal breath sounds.   Musculoskeletal:      Cervical back: Neck supple.      Right lower leg: No edema.      Left lower leg: No edema.   Feet:      Comments: Protective Sensation (w/ 10 gram monofilament):  Right: Intact  Left: Intact    Visual Inspection:  Normal -  Bilateral    Pedal Pulses:   Right: Present  Left: Present        Lymphadenopathy:      Cervical: No cervical adenopathy.   Skin:     General: Skin is warm and dry.      Findings: No rash.   Neurological:      General: No focal deficit present.      Mental Status: She is alert and oriented to person, place, and time.   Psychiatric:         Mood and Affect: Mood normal.         Behavior: Behavior normal.         Thought Content: Thought content normal.                  [1]   Social History  Socioeconomic History    Marital status:     Number of children: 1   Tobacco Use    Smoking status: Never     Passive exposure: Never    Smokeless tobacco: Never   Substance and Sexual Activity    Alcohol use: Yes     Alcohol/week: 2.0 standard drinks of alcohol     Types: 1 Glasses of wine, 1 Shots of liquor per week     Comment: rare    Drug use: Never    Sexual activity: Yes     Partners: Male     Birth control/protection: None     Social Drivers of Health     Financial Resource Strain: Low Risk  (4/22/2025)    Overall Financial Resource Strain (CARDIA)     Difficulty of Paying Living Expenses: Not hard at all   Food Insecurity: No Food Insecurity (4/22/2025)    Hunger Vital Sign     Worried About Running Out of Food in the Last Year: Never true     Ran Out of Food in the Last Year: Never true   Transportation Needs: No Transportation Needs (4/22/2025)    PRAPARE - Transportation     Lack of Transportation (Medical): No      Lack of Transportation (Non-Medical): No   Physical Activity: Insufficiently Active (4/22/2025)    Exercise Vital Sign     Days of Exercise per Week: 2 days     Minutes of Exercise per Session: 40 min   Stress: Stress Concern Present (4/22/2025)    [employer] - Occupational Stress Questionnaire     Feeling of Stress : Rather much   Housing Stability: Low Risk  (4/22/2025)    Housing Stability Vital Sign     Unable to Pay for Housing in the Last Year: No     Number of Times Moved in the Last Year: 0     Homeless in the Last Year: No   [2]   Current Outpatient Medications   Medication Sig Dispense Refill    atorvastatin (LIPITOR) 40 MG tablet Take 1 tablet (40 mg total) by mouth once daily. 90 tablet 3    baclofen (LIORESAL) 10 MG tablet Take 1 tablet (10 mg total) by mouth 2 (two) times daily. 60 tablet 5    blood sugar diagnostic Strp Place 1 strip onto the skin 3 (three) times daily. 300 strip 3    blood-glucose meter Misc To check BG once daily. 1 each 0    calcium carbonate-vitamin D3 600 mg-10 mcg (400 unit) Cap Take 1 tablet by mouth once daily.      ciclopirox (LOPROX) 0.77 % Crea Apply topically 2 (two) times daily.      coenzyme Q10 200 mg capsule Take 1 capsule by mouth 2 (two) times daily.      estradioL (ESTRACE) 0.01 % (0.1 mg/gram) vaginal cream Place 0.5 g vaginally twice a week. 42.5 g 3    fluconazole (DIFLUCAN) 150 MG Tab Take 1 tablet by mouth every other day.      icosapent ethyL (VASCEPA) 1 gram Cap Take 2 capsules by mouth 2 (two) times daily.      lancets 33 gauge Misc To check BG three times daily. 300 each 3    levothyroxine (SYNTHROID) 112 MCG tablet Take 1 tablet (112 mcg total) by mouth before breakfast. 90 tablet 3    metFORMIN (GLUCOPHAGE-XR) 500 MG ER 24hr tablet Take 2 tablets (1,000 mg total) by mouth once daily. 180 tablet 0    omega-3 fatty acids 1,000 mg Cap Take 2 capsules by mouth once daily.      omeprazole (PRILOSEC) 20 MG capsule Take 1  capsule (20 mg total) by mouth once daily. 90 capsule 3    omeprazole (PRILOSEC) 20 MG capsule Take 1 capsule (20 mg total) by mouth once daily. 90 capsule 2    promethazine-dextromethorphan (PROMETHAZINE-DM) 6.25-15 mg/5 mL Syrp Take 5 mLs by mouth every 8 (eight) hours as needed (cough). 118 mL 1    terconazole (TERAZOL 3) 0.8 % vaginal cream Place 1 applicator vaginally.      valsartan (DIOVAN) 160 MG tablet Take 1 tablet (160 mg total) by mouth 2 (two) times daily. 180 tablet 3    ciclopirox (LOPROX) 0.77 % Crea Apply topically.      methocarbamoL 1,000 mg Tab Take 1,000 mg by mouth 3 (three) times daily as needed (muscle spasms). 90 tablet 3    nitrofurantoin, macrocrystal-monohydrate, (MACROBID) 100 MG capsule TAKE 1 CAPSULE BY MOUTH TWICE DAILY FOR 5 DAYS (Patient not taking: Reported on 4/22/2025)      polyethylene glycol (COLYTE) 240-22.72-6.72 -5.84 gram SolR Patient with history of Constipation/Poor Prep. Please dispense 2 kits as directed. (Patient not taking: Reported on 4/22/2025) 8000 mL 0    tirzepatide 7.5 mg/0.5 mL PnIj Inject 7.5 mg into the skin every 7 days. 2 mL 11    triamcinolone (KENALOG) 0.5 % ointment Apply topically.      zolpidem (AMBIEN CR) 6.25 MG CR tablet Take 1 tablet (6.25 mg total) by mouth nightly as needed for Insomnia. 90 tablet 3     No current facility-administered medications for this visit.

## 2025-04-23 ENCOUNTER — TELEPHONE (OUTPATIENT)
Dept: FAMILY MEDICINE | Facility: CLINIC | Age: 60
End: 2025-04-23
Payer: COMMERCIAL

## 2025-04-23 DIAGNOSIS — M70.61 TROCHANTERIC BURSITIS OF RIGHT HIP: Primary | ICD-10-CM

## 2025-04-23 RX ORDER — METHOCARBAMOL 500 MG/1
1000 TABLET, FILM COATED ORAL NIGHTLY PRN
Qty: 30 TABLET | Refills: 2 | Status: SHIPPED | OUTPATIENT
Start: 2025-04-23

## 2025-04-23 NOTE — TELEPHONE ENCOUNTER
----- Message from Yann sent at 4/23/2025  9:10 AM CDT -----  Type: Patient Call Back Who called: [facility]  What is the request in detail: pharmacists is stating that methocarbamoL 1,000 mg Tab does not come in 1000 mg, only 500 or 750. They would like clarity if they should give pt 500 mg to take two tablets 3x day a day instead of 1.  Can the clinic reply by [facility]? Would the patient rather a call back or a response via [facility]? Call back  Best call back number: [phone] Additional Information:

## 2025-04-24 ENCOUNTER — TELEPHONE (OUTPATIENT)
Dept: FAMILY MEDICINE | Facility: CLINIC | Age: 60
End: 2025-04-24
Payer: COMMERCIAL

## 2025-04-24 NOTE — TELEPHONE ENCOUNTER
----- Message from Calvin sent at 4/24/2025  9:07 AM CDT -----  Regarding: [facility]  Type: Patient Call BackWho called[facility] is the [facility] in detail: Dr Aria Byrd submitted instructions to [facility] for the patients methocarbamoL (ROBAXIN) 500 MG Tab. Humera Perry submitted different instructions for same med/same patient. [facility] wants to clarify so they can fill for the patient. Please call as soon as possible. Can the clinic reply by [facility]? NoWould the patient rather a call back or a response via [facility]? [facility] - [address] - [phone] [place] [address]Skzly: [phone] Fax: [phone] Information:Thank you.

## 2025-04-24 NOTE — TELEPHONE ENCOUNTER
----- Message from Nery sent at 4/22/2025  4:46 PM CDT -----  Type: RX CLARIFICATION Who Called: Abrahan (Pharmacy Tech)Refill or New Rx: RX CLARIFICATIONRx Name and Strength: methocarbamoL 1,000 mg TabPharmacy is requesting a callback regarding medication MG. Abrahna advised that the medication isn't offered in 1,000 mgWould the patient rather a call back or a response via [facility]? CallBe Call Back Number: [phone] Information:  [facility] - [place] - [address] [place] [address]Icggn: [phone] Fax: [phone]

## 2025-04-29 DIAGNOSIS — E11.649 TYPE 2 DIABETES MELLITUS WITH HYPOGLYCEMIA WITHOUT COMA, WITHOUT LONG-TERM CURRENT USE OF INSULIN: ICD-10-CM

## 2025-04-29 RX ORDER — METFORMIN HYDROCHLORIDE 500 MG/1
1000 TABLET, EXTENDED RELEASE ORAL
Qty: 270 TABLET | Refills: 3 | Status: SHIPPED | OUTPATIENT
Start: 2025-04-29

## 2025-04-29 NOTE — TELEPHONE ENCOUNTER
Refill Routing Note   Medication(s) are not appropriate for processing by [facility] for the following reason(s):        Required labs outdated    [facility] action(s):  Defer   Requires labs : Yes - CMP, A1c, Lipid panel            Appointments  past 12m or future 3m with PCP    Date Provider   Last Visit   4/22/2025 Britney Byrd, DO   Next Visit   Visit date not found Britney Byrd, DO   ED visits in past 90 days: 0        Note composed:9:48 AM 04/29/2025

## 2025-04-29 NOTE — TELEPHONE ENCOUNTER
Care Due:                  Date            Visit Type   Department     Provider  --------------------------------------------------------------------------------                                [facility]                              PRIMARY      / INTERNAL MED  Last Visit: 04-      CARE ([facility])   / YOHANNES Byrd  Next Visit: None Scheduled  None         None Found                                                            Last  Test          Frequency    Reason                     Performed    Due Date  --------------------------------------------------------------------------------    CMP.........  12 months..  atorvastatin, metFORMIN,   09- 09-                             valsartan................    HBA1C.......  6 months...  metFORMIN, tirzepatide...  10-   03-    Lipid Panel.  12 months..  atorvastatin.............  09- 09-    [facility] Embedded Care Due Messages. Reference number: [id number].   4/29/2025 8:05:08 AM CDT

## 2025-06-30 ENCOUNTER — PATIENT MESSAGE (OUTPATIENT)
Dept: ADMINISTRATIVE | Facility: HOSPITAL | Age: 60
End: 2025-06-30
Payer: COMMERCIAL